# Patient Record
Sex: FEMALE | Race: WHITE | Employment: PART TIME | URBAN - METROPOLITAN AREA
[De-identification: names, ages, dates, MRNs, and addresses within clinical notes are randomized per-mention and may not be internally consistent; named-entity substitution may affect disease eponyms.]

---

## 2018-02-14 PROBLEM — I48.91 ATRIAL FIBRILLATION WITH RVR (HCC): Status: ACTIVE | Noted: 2018-02-14

## 2018-02-14 PROBLEM — I10 HTN (HYPERTENSION): Status: ACTIVE | Noted: 2018-02-14

## 2018-02-16 PROBLEM — I50.21 ACUTE SYSTOLIC CONGESTIVE HEART FAILURE (HCC): Status: ACTIVE | Noted: 2018-02-16

## 2018-02-19 ENCOUNTER — OFFICE VISIT (OUTPATIENT)
Dept: CARDIOLOGY CLINIC | Age: 68
End: 2018-02-19

## 2018-02-19 VITALS
WEIGHT: 192.4 LBS | DIASTOLIC BLOOD PRESSURE: 82 MMHG | BODY MASS INDEX: 32.06 KG/M2 | RESPIRATION RATE: 18 BRPM | HEART RATE: 70 BPM | HEIGHT: 65 IN | OXYGEN SATURATION: 97 % | SYSTOLIC BLOOD PRESSURE: 110 MMHG

## 2018-02-19 DIAGNOSIS — I48.19 PERSISTENT ATRIAL FIBRILLATION (HCC): Primary | ICD-10-CM

## 2018-02-19 DIAGNOSIS — I48.0 PAROXYSMAL ATRIAL FIBRILLATION (HCC): Primary | ICD-10-CM

## 2018-02-19 DIAGNOSIS — I10 ESSENTIAL HYPERTENSION: ICD-10-CM

## 2018-02-19 DIAGNOSIS — I42.9 CARDIOMYOPATHY, UNSPECIFIED TYPE (HCC): ICD-10-CM

## 2018-02-19 PROCEDURE — 99214 OFFICE O/P EST MOD 30 MIN: CPT | Performed by: NURSE PRACTITIONER

## 2018-02-19 RX ORDER — METOPROLOL SUCCINATE 100 MG/1
100 TABLET, EXTENDED RELEASE ORAL DAILY
Qty: 30 TABLET | Refills: 5 | Status: SHIPPED | OUTPATIENT
Start: 2018-02-19 | End: 2018-02-26 | Stop reason: SDUPTHER

## 2018-02-19 RX ORDER — LISINOPRIL 20 MG/1
20 TABLET ORAL DAILY
COMMUNITY
End: 2018-03-14 | Stop reason: SDUPTHER

## 2018-02-19 NOTE — PROGRESS NOTES
[facility]   Electrophysiology  Date: 2/19/2018    Chief Complaint   Patient presents with    Cardiomyopathy    Atrial Fibrillation       Cardiac HX: Linnea Rasmussen is a 79 y.o. woman with no cardiac history who presented with atrial fibrillation with RVR, s/s of an URI for about a week. She admitted to abd bloating off and on over the past several months.  Hypertensive, neg trops and EF 25% per echo. Was not interested in an ischemic eval.      Today: Patient d/c'd on Saturday and here for f/u. Feels weak and tired at home. Has been weighing self daily and is losing weight (trying). Is monitoring Na+ at home. Has not been checking B. Denies CP or SOB. Did have some swelling yesterday and then diuresed later in the day. Denies palpitations. Reviewed meds at length. Was not able to afford the [facility]. Home medications:   Current Outpatient Prescriptions on File Prior to Visit   Medication Sig Dispense Refill    aspirin 81 MG tablet Take 1 tablet by mouth daily 30 tablet 0    apixaban (ELIQUIS) 5 MG TABS tablet Take 1 tablet by mouth 2 times daily 60 tablet 0    hydrALAZINE (APRESOLINE) 50 MG tablet Take 1 tablet by mouth every 8 hours 90 tablet 0    furosemide (LASIX) 40 MG tablet Take 1 tablet by mouth daily 60 tablet 0    potassium chloride (KLOR-CON M) 20 MEQ extended release tablet Take 1 tablet by mouth 2 times daily (with meals) 60 tablet 0    Coenzyme Q10 (COQ10 PO) Take 1 capsule by mouth daily      CALCIUM-MAGNESIUM-VITAMIN D PO Take 1 capsule by mouth daily      PANTOTHENIC ACID PO Take 1 capsule by mouth daily      b complex vitamins capsule Take 1 capsule by mouth daily      vitamin B-12 (CYANOCOBALAMIN) 500 MCG tablet Take 500 mcg by mouth nightly       No current facility-administered medications on file prior to visit.         Past Medical History:   Diagnosis Date    Thyroid disease         Past Surgical History:   Procedure Laterality Date    HAND SURGERY Right · Abdominal: Soft. Bowel sounds present. No distension, No tenderness. · Musculoskeletal: No tenderness. No edema    · Lymphadenopathy: Has no cervical adenopathy. · Neurological: Alert and oriented. Cranial nerve appears intact, No Gross deficit   · Skin: Skin is warm and dry. No rash noted. · Psychiatric: Has a normal mood, affect and behavior     Labs:  Reviewed. Recent Labs      02/17/18   0522   NA  141   K  4.1   CL  106   CO2  24   BUN  15   CREATININE  0.9     No results for input(s): WBC, HGB, HCT, MCV, PLT in the last 72 hours. Lab Results   Component Value Date    TROPONINI <0.01 02/14/2018     No results found for: BNP  Lab Results   Component Value Date    PROTIME 12.7 02/14/2018    INR 1.12 02/14/2018     Lab Results   Component Value Date    CHOL 182 02/17/2018    HDL 68 02/17/2018    TRIG 60 02/17/2018       ECG: Personally reviewed: AF, HR 80, QRS 90, QTc 461    ECHO:  2/15/2018  Summary   Left ventricular size is normal. There is mild concentric left ventricular   hypertrophy. Left ventricular function appears moderately reduced with   ejection fraction estimated at 25%. Global hypokinesis of left ventricle.   The diastolic dysfunction is indeterminate due to heart rhythm. The mitral   valve leaflets are slightly thickened with normal leaflet mobility. Mitral   annular calcification is present. Moderate mitral regurgitation is present.  Hamida Abts is mild-moderate tricuspid regurgitation with RVSP estimated at 33   mmHg. Bi-atrial enlargement. There is a trivial circumferential pericardial   effusion noted. No prior echo for comparison.       Problem List:   Patient Active Problem List    Diagnosis Date Noted    Acute systolic congestive heart failure ([facility] [address] 02/16/2018     Priority: High    Persistent atrial fibrillation (HCC)      Priority: High    Cardiomyopathy [facility])      Priority: High    Atrial fibrillation with RVR ([facility] [address] 02/14/2018     Priority: Low    HTN (hypertension) 02/14/2018

## 2018-02-23 ENCOUNTER — TELEPHONE (OUTPATIENT)
Dept: CARDIOLOGY CLINIC | Age: 68
End: 2018-02-23

## 2018-02-26 RX ORDER — METOPROLOL SUCCINATE 50 MG/1
50 TABLET, EXTENDED RELEASE ORAL DAILY
Qty: 30 TABLET | Refills: 5 | Status: SHIPPED | OUTPATIENT
Start: 2018-02-26 | End: 2018-04-10 | Stop reason: SDUPTHER

## 2018-02-27 ENCOUNTER — TELEPHONE (OUTPATIENT)
Dept: CARDIOLOGY CLINIC | Age: 68
End: 2018-02-27

## 2018-02-27 DIAGNOSIS — R10.9 FLANK PAIN: Primary | ICD-10-CM

## 2018-02-28 LAB
BUN BLDV-MCNC: 25 MG/DL
CALCIUM SERPL-MCNC: 10.3 MG/DL
CHLORIDE BLD-SCNC: 101 MMOL/L
CO2: 31 MMOL/L
CREAT SERPL-MCNC: 1.15 MG/DL
GFR CALCULATED: NORMAL
GLUCOSE BLD-MCNC: 90 MG/DL
POTASSIUM SERPL-SCNC: 5.5 MMOL/L
SODIUM BLD-SCNC: 138 MMOL/L

## 2018-02-28 NOTE — TELEPHONE ENCOUNTER
Patient called per Anastasiya Nicolas RN. 1 single outlier BP with out s/s. Patient to monitor BP and come in on Thursday to check BP and BP device with RN.

## 2018-03-01 ENCOUNTER — NURSE ONLY (OUTPATIENT)
Dept: CARDIOLOGY CLINIC | Age: 68
End: 2018-03-01

## 2018-03-01 ENCOUNTER — TELEPHONE (OUTPATIENT)
Dept: CARDIOLOGY CLINIC | Age: 68
End: 2018-03-01

## 2018-03-01 VITALS — HEART RATE: 84 BPM | DIASTOLIC BLOOD PRESSURE: 80 MMHG | RESPIRATION RATE: 20 BRPM | SYSTOLIC BLOOD PRESSURE: 107 MMHG

## 2018-03-01 DIAGNOSIS — R10.9 LEFT FLANK PAIN: Primary | ICD-10-CM

## 2018-03-02 ENCOUNTER — HOSPITAL ENCOUNTER (OUTPATIENT)
Dept: OTHER | Age: 68
Discharge: HOME | End: 2018-03-02
Attending: NURSE PRACTITIONER | Admitting: NURSE PRACTITIONER

## 2018-03-02 DIAGNOSIS — R10.9 LEFT FLANK PAIN: ICD-10-CM

## 2018-03-02 LAB
AMORPHOUS: ABNORMAL /HPF
B-TYPE NATRIURETIC PEPTIDE: 366 PG/ML
BACTERIA: ABNORMAL /HPF
BILIRUBIN URINE: NEGATIVE
BLOOD, URINE: NEGATIVE
BUN / CREAT RATIO: 22 (CALC) (ref 6–22)
BUN BLDV-MCNC: 25 MG/DL (ref 7–25)
CALCIUM OXALATE CRYSTALS: ABNORMAL /HPF
CALCIUM SERPL-MCNC: 10.3 MG/DL (ref 8.6–10.4)
CASTS: ABNORMAL /LPF
CHLORIDE BLD-SCNC: 101 MMOL/L (ref 98–110)
CLARITY: CLEAR
CO2: 31 MMOL/L (ref 20–31)
COLOR: YELLOW
COMMENT: ABNORMAL
COMMENT: ABNORMAL
CREAT SERPL-MCNC: 1.15 MG/DL (ref 0.5–0.99)
CRYSTALS: ABNORMAL /HPF
CULTURE: NORMAL
EPITHELIAL CELLS, UA: ABNORMAL /HPF
GFR AFRICAN AMERICAN: 57 ML/MIN/1.73M2
GFR SERPL CREATININE-BSD FRML MDRD: 49 ML/MIN/1.73M2
GLUCOSE BLD-MCNC: 90 MG/DL (ref 65–99)
GLUCOSE URINE: NEGATIVE
GRANULAR CASTS: ABNORMAL /LPF
HYALINE CASTS: ABNORMAL /LPF
KETONES, URINE: NEGATIVE
LEUKOCYTE ESTERASE, URINE: ABNORMAL
LEUKOCYTES, UA: ABNORMAL /HPF
NITRITE, URINE: NEGATIVE
PH UA: 5.5 (ref 5–8)
POTASSIUM SERPL-SCNC: 5.5 MMOL/L (ref 3.5–5.3)
PROTEIN UA: NEGATIVE
RBC UA: ABNORMAL /HPF
REDUCING SUBSTANCES, URINE: ABNORMAL %
RENAL EPITHELIAL, POC: ABNORMAL /HPF
SODIUM BLD-SCNC: 138 MMOL/L (ref 135–146)
SPECIFIC GRAVITY UA: 1.01 (ref 1–1.03)
TRANSITIONAL EPITHELIAL: ABNORMAL /HPF
TRIPLE PHOSPHATE CRYSTALS: ABNORMAL /HPF
URATE CRYSTALS, URINE: ABNORMAL /HPF
YEAST: ABNORMAL /HPF

## 2018-03-05 ENCOUNTER — OFFICE VISIT (OUTPATIENT)
Dept: CARDIOLOGY CLINIC | Age: 68
End: 2018-03-05

## 2018-03-05 VITALS
HEART RATE: 75 BPM | BODY MASS INDEX: 31.18 KG/M2 | DIASTOLIC BLOOD PRESSURE: 64 MMHG | SYSTOLIC BLOOD PRESSURE: 100 MMHG | WEIGHT: 187.4 LBS

## 2018-03-05 DIAGNOSIS — I48.19 PERSISTENT ATRIAL FIBRILLATION (HCC): ICD-10-CM

## 2018-03-05 DIAGNOSIS — I42.9 CARDIOMYOPATHY, UNSPECIFIED TYPE (HCC): Primary | ICD-10-CM

## 2018-03-05 DIAGNOSIS — R10.9 LEFT FLANK PAIN: ICD-10-CM

## 2018-03-05 DIAGNOSIS — B02.9 HERPES ZOSTER WITHOUT COMPLICATION: ICD-10-CM

## 2018-03-05 DIAGNOSIS — I50.22 CHRONIC SYSTOLIC HEART FAILURE (HCC): ICD-10-CM

## 2018-03-05 PROCEDURE — 99214 OFFICE O/P EST MOD 30 MIN: CPT | Performed by: NURSE PRACTITIONER

## 2018-03-05 RX ORDER — FUROSEMIDE 40 MG/1
40 TABLET ORAL DAILY
Qty: 30 TABLET | Refills: 5 | Status: SHIPPED | OUTPATIENT
Start: 2018-03-05 | End: 2018-10-09 | Stop reason: SDUPTHER

## 2018-03-05 RX ORDER — HYDRALAZINE HYDROCHLORIDE 25 MG/1
25 TABLET, FILM COATED ORAL EVERY 8 HOURS SCHEDULED
Qty: 90 TABLET | Refills: 5 | Status: SHIPPED | OUTPATIENT
Start: 2018-03-05 | End: 2018-06-05 | Stop reason: ALTCHOICE

## 2018-03-05 NOTE — PATIENT INSTRUCTIONS
Metoprolol tartrate is the short acting metoprolol. This needs to be taken twice a day. Cut the 50 mg tablet in 1/2 and taken 1/2 in the morning and 1/2 at night. When these are gone then we will transition to the metoprolol succinate which is the long acting metoprolol. This is taken once a day. Patient Education        Shingles: Care Instructions  Your Care Instructions    Shingles (herpes zoster) causes pain and a blistered rash. The rash can appear anywhere on the body but will be on only one side of the body, the left or right. It will be in a band, a strip, or a small area. The pain can be very severe. Shingles can also cause tingling or itching in the area of the rash. The blisters scab over after a few days and heal in 2 to 4 weeks. Medicines can help you feel better and may help prevent more serious problems caused by shingles. Shingles is caused by the same virus that causes chickenpox. When you have chickenpox, the virus gets into your nerve roots and stays there (becomes dormant) long after you get over the chickenpox. If the virus becomes active again, it can cause shingles. Follow-up care is a key part of your treatment and safety. Be sure to make and go to all appointments, and call your doctor if you are having problems. It's also a good idea to know your test results and keep a list of the medicines you take. How can you care for yourself at home? · Be safe with medicines. Take your medicines exactly as prescribed. Call your doctor if you think you are having a problem with your medicine. Antiviral medicine helps you get better faster. · Try not to scratch or pick at the blisters. They will crust over and fall off on their own if you leave them alone. · Put cool, wet cloths on the area to relieve pain and itching. You can also use calamine lotion. Try not to use so much lotion that it cakes and is hard to get off.   · Put cornstarch or baking soda on the sores to help dry them out so they

## 2018-03-05 NOTE — PROGRESS NOTES
smoked. She has never used smokeless tobacco. She reports that she does not drink alcohol or use drugs. Family History:  Reviewed. family history includes Coronary Art Dis in her father; Other in her mother. Review of System:    · Constitutional: No fevers, chills. · Eyes: No visual changes or diplopia. No scleral icterus. · ENT: No Headaches. No mouth sores or sore throat. · Cardiovascular: No for chest pain, Yes for dyspnea on exertion, No for palpitations or No for loss of consciousness. No cough, hemoptysis, No for pleuritic pain, or phlebitis. · Respiratory: No for cough or wheezing. No hematemesis. · Gastrointestinal: No abdominal pain, blood in stools. · Genitourinary: No dysuria, or hematuria. · Musculoskeletal: No gait disturbance,    · Integumentary: No rash or pruritis. · Neurological: No headache, change in muscle strength, numbness or tingling. · Psychiatric: No anxiety, or depression. · Endocrine: No temperature intolerance. No excessive thirst, fluid intake, or urination. · Hem/Lymph: No abnormal bruising or bleeding, blood clots or swollen lymph nodes. · Allergic/Immunologic: No nasal congestion or hives. Physical Examination:  Vitals:    03/05/18 0951   BP: 100/64   Pulse: 75         Wt Readings from Last 3 Encounters:   03/05/18 187 lb 6.4 oz (85 kg)   02/19/18 192 lb 6.4 oz (87.3 kg)   02/17/18 196 lb 13.9 oz (89.3 kg)       · Constitutional: Oriented. No distress. · Head: Normocephalic and atraumatic. · Mouth/Throat: Oropharynx is clear and moist.   · Eyes: Conjunctivae clear without jaunduice. PERRL. · Neck: Neck supple. No rigidity. No JVD present. · Cardiovascular: Normal rate, regular rhythm, S1&S2. · Pulmonary/Chest: Bilateral respiratory sounds. No wheezes, No rhonchi. · Abdominal: Soft. Bowel sounds present. No distension, No tenderness. · Musculoskeletal: No tenderness. No edema    · Lymphadenopathy: Has no cervical adenopathy.

## 2018-03-08 DIAGNOSIS — I50.21 ACUTE SYSTOLIC CONGESTIVE HEART FAILURE (HCC): ICD-10-CM

## 2018-03-08 DIAGNOSIS — I48.19 PERSISTENT ATRIAL FIBRILLATION (HCC): Primary | ICD-10-CM

## 2018-03-08 DIAGNOSIS — I48.91 ATRIAL FIBRILLATION WITH RVR (HCC): ICD-10-CM

## 2018-03-08 DIAGNOSIS — I42.9 CARDIOMYOPATHY, UNSPECIFIED TYPE (HCC): ICD-10-CM

## 2018-03-08 DIAGNOSIS — I10 ESSENTIAL HYPERTENSION: ICD-10-CM

## 2018-03-14 DIAGNOSIS — I10 ESSENTIAL HYPERTENSION: Primary | ICD-10-CM

## 2018-03-14 RX ORDER — LISINOPRIL 20 MG/1
20 TABLET ORAL DAILY
Qty: 30 TABLET | Refills: 5 | Status: SHIPPED | OUTPATIENT
Start: 2018-03-14 | End: 2018-09-05

## 2018-03-14 NOTE — TELEPHONE ENCOUNTER
Spoke with pt. She has not been taking potassium. Advised to cut back on potassium rich foods and to get labs rechecked later next week. Pt verbalized understanding. Needs a refill on lisinopril.

## 2018-03-24 LAB
A/G RATIO: 1.7 (CALC) (ref 1–2.5)
ALBUMIN SERPL-MCNC: 3.8 G/DL (ref 3.6–5.1)
ALP BLD-CCNC: 60 U/L (ref 33–130)
ALT SERPL-CCNC: 15 U/L (ref 6–29)
AST SERPL-CCNC: 24 U/L (ref 10–35)
BILIRUB SERPL-MCNC: 0.5 MG/DL (ref 0.2–1.2)
BUN / CREAT RATIO: 18 (CALC) (ref 6–22)
BUN BLDV-MCNC: 21 MG/DL (ref 7–25)
CALCIUM SERPL-MCNC: 9.4 MG/DL (ref 8.6–10.4)
CHLORIDE BLD-SCNC: 106 MMOL/L (ref 98–110)
CO2: 30 MMOL/L (ref 20–31)
CREAT SERPL-MCNC: 1.14 MG/DL (ref 0.5–0.99)
GFR AFRICAN AMERICAN: 58 ML/MIN/1.73M2
GFR SERPL CREATININE-BSD FRML MDRD: 50 ML/MIN/1.73M2
GLOBULIN: 2.2 G/DL (CALC) (ref 1.9–3.7)
GLUCOSE BLD-MCNC: 72 MG/DL (ref 65–99)
POTASSIUM SERPL-SCNC: 4.4 MMOL/L (ref 3.5–5.3)
SODIUM BLD-SCNC: 143 MMOL/L (ref 135–146)
TOTAL PROTEIN: 6 G/DL (ref 6.1–8.1)

## 2018-03-27 LAB
ALBUMIN SERPL-MCNC: 3.8 G/DL
ALP BLD-CCNC: 60 U/L
ALT SERPL-CCNC: 15 U/L
ANION GAP SERPL CALCULATED.3IONS-SCNC: NORMAL MMOL/L
AST SERPL-CCNC: 24 U/L
BILIRUB SERPL-MCNC: 0.5 MG/DL (ref 0.1–1.4)
BUN BLDV-MCNC: 18 MG/DL
CALCIUM SERPL-MCNC: 9.4 MG/DL
CHLORIDE BLD-SCNC: 106 MMOL/L
CO2: 30 MMOL/L
CREAT SERPL-MCNC: 18 MG/DL
GFR CALCULATED: 50
GLUCOSE BLD-MCNC: 72 MG/DL
POTASSIUM SERPL-SCNC: 4.4 MMOL/L
SODIUM BLD-SCNC: 143 MMOL/L
TOTAL PROTEIN: 6

## 2018-04-10 RX ORDER — METOPROLOL SUCCINATE 50 MG/1
50 TABLET, EXTENDED RELEASE ORAL DAILY
Qty: 30 TABLET | Refills: 5 | Status: SHIPPED | OUTPATIENT
Start: 2018-04-10 | End: 2018-08-08 | Stop reason: DRUGHIGH

## 2018-04-18 ENCOUNTER — TELEPHONE (OUTPATIENT)
Dept: CARDIOLOGY CLINIC | Age: 68
End: 2018-04-18

## 2018-05-10 ENCOUNTER — OFFICE VISIT (OUTPATIENT)
Dept: PRIMARY CARE CLINIC | Age: 68
End: 2018-05-10

## 2018-05-10 VITALS
OXYGEN SATURATION: 98 % | RESPIRATION RATE: 16 BRPM | SYSTOLIC BLOOD PRESSURE: 120 MMHG | HEART RATE: 78 BPM | BODY MASS INDEX: 31.08 KG/M2 | HEIGHT: 63 IN | WEIGHT: 175.4 LBS | TEMPERATURE: 97.8 F | DIASTOLIC BLOOD PRESSURE: 78 MMHG

## 2018-05-10 DIAGNOSIS — Z28.21 IMMUNIZATION REFUSED: ICD-10-CM

## 2018-05-10 DIAGNOSIS — I50.21 ACUTE SYSTOLIC CONGESTIVE HEART FAILURE (HCC): ICD-10-CM

## 2018-05-10 DIAGNOSIS — I10 BENIGN ESSENTIAL HTN: ICD-10-CM

## 2018-05-10 DIAGNOSIS — I48.19 PERSISTENT ATRIAL FIBRILLATION (HCC): Primary | ICD-10-CM

## 2018-05-10 DIAGNOSIS — Z53.20 MAMMOGRAM DECLINED: ICD-10-CM

## 2018-05-10 DIAGNOSIS — Z53.20 COLONOSCOPY REFUSED: ICD-10-CM

## 2018-05-10 PROCEDURE — 99204 OFFICE O/P NEW MOD 45 MIN: CPT | Performed by: INTERNAL MEDICINE

## 2018-05-10 ASSESSMENT — PATIENT HEALTH QUESTIONNAIRE - PHQ9
2. FEELING DOWN, DEPRESSED OR HOPELESS: 0
1. LITTLE INTEREST OR PLEASURE IN DOING THINGS: 0
SUM OF ALL RESPONSES TO PHQ QUESTIONS 1-9: 0
SUM OF ALL RESPONSES TO PHQ9 QUESTIONS 1 & 2: 0

## 2018-05-31 ENCOUNTER — NURSE ONLY (OUTPATIENT)
Dept: CARDIOLOGY CLINIC | Age: 68
End: 2018-05-31

## 2018-05-31 ENCOUNTER — OFFICE VISIT (OUTPATIENT)
Dept: CARDIOLOGY CLINIC | Age: 68
End: 2018-05-31

## 2018-05-31 VITALS
SYSTOLIC BLOOD PRESSURE: 122 MMHG | OXYGEN SATURATION: 98 % | BODY MASS INDEX: 30.29 KG/M2 | DIASTOLIC BLOOD PRESSURE: 84 MMHG | WEIGHT: 171 LBS | HEART RATE: 94 BPM

## 2018-05-31 DIAGNOSIS — I50.22 CHRONIC SYSTOLIC HEART FAILURE (HCC): ICD-10-CM

## 2018-05-31 DIAGNOSIS — I10 ESSENTIAL HYPERTENSION: Primary | ICD-10-CM

## 2018-05-31 DIAGNOSIS — I48.19 PERSISTENT ATRIAL FIBRILLATION (HCC): ICD-10-CM

## 2018-05-31 DIAGNOSIS — I42.9 CARDIOMYOPATHY, UNSPECIFIED TYPE (HCC): ICD-10-CM

## 2018-05-31 PROCEDURE — 93000 ELECTROCARDIOGRAM COMPLETE: CPT | Performed by: INTERNAL MEDICINE

## 2018-05-31 PROCEDURE — 99214 OFFICE O/P EST MOD 30 MIN: CPT | Performed by: INTERNAL MEDICINE

## 2018-06-05 ENCOUNTER — HOSPITAL ENCOUNTER (OUTPATIENT)
Dept: CARDIAC CATH/INVASIVE PROCEDURES | Age: 68
Discharge: HOME | End: 2018-06-05
Attending: INTERNAL MEDICINE | Admitting: INTERNAL MEDICINE

## 2018-06-05 VITALS — BODY MASS INDEX: 27.99 KG/M2 | WEIGHT: 168 LBS | TEMPERATURE: 98.1 F | HEIGHT: 65 IN

## 2018-06-05 LAB
ANION GAP SERPL CALCULATED.3IONS-SCNC: 12 MMOL/L (ref 3–16)
BUN BLDV-MCNC: 24 MG/DL (ref 7–20)
CALCIUM SERPL-MCNC: 9.7 MG/DL (ref 8.3–10.6)
CHLORIDE BLD-SCNC: 103 MMOL/L (ref 99–110)
CO2: 28 MMOL/L (ref 21–32)
CREAT SERPL-MCNC: 1.2 MG/DL (ref 0.6–1.2)
EKG ATRIAL RATE: 43 BPM
EKG ATRIAL RATE: 66 BPM
EKG DIAGNOSIS: NORMAL
EKG DIAGNOSIS: NORMAL
EKG P AXIS: 11 DEGREES
EKG P-R INTERVAL: 236 MS
EKG Q-T INTERVAL: 402 MS
EKG Q-T INTERVAL: 490 MS
EKG QRS DURATION: 92 MS
EKG QRS DURATION: 98 MS
EKG QTC CALCULATION (BAZETT): 414 MS
EKG QTC CALCULATION (BAZETT): 421 MS
EKG R AXIS: -33 DEGREES
EKG R AXIS: -43 DEGREES
EKG T AXIS: -44 DEGREES
EKG T AXIS: -89 DEGREES
EKG VENTRICULAR RATE: 43 BPM
EKG VENTRICULAR RATE: 66 BPM
GFR AFRICAN AMERICAN: 54
GFR NON-AFRICAN AMERICAN: 45
GLUCOSE BLD-MCNC: 88 MG/DL (ref 70–99)
INR BLD: 2.2 (ref 0.8–1.4)
POTASSIUM REFLEX MAGNESIUM: 4.7 MMOL/L (ref 3.5–5.1)
SODIUM BLD-SCNC: 143 MMOL/L (ref 136–145)

## 2018-06-05 PROCEDURE — 93010 ELECTROCARDIOGRAM REPORT: CPT | Performed by: INTERNAL MEDICINE

## 2018-06-05 PROCEDURE — 92960 CARDIOVERSION ELECTRIC EXT: CPT | Performed by: INTERNAL MEDICINE

## 2018-06-05 RX ORDER — SODIUM CHLORIDE 0.9 % (FLUSH) 0.9 %
10 SYRINGE (ML) INJECTION PRN
Status: DISCONTINUED | OUTPATIENT
Start: 2018-06-05 | End: 2018-06-06 | Stop reason: HOSPADM

## 2018-06-05 RX ORDER — SODIUM CHLORIDE 0.9 % (FLUSH) 0.9 %
10 SYRINGE (ML) INJECTION EVERY 12 HOURS SCHEDULED
Status: DISCONTINUED | OUTPATIENT
Start: 2018-06-05 | End: 2018-06-06 | Stop reason: HOSPADM

## 2018-06-05 RX ORDER — MIDAZOLAM HYDROCHLORIDE 1 MG/ML
1 INJECTION INTRAMUSCULAR; INTRAVENOUS ONCE
Status: DISCONTINUED | OUTPATIENT
Start: 2018-06-05 | End: 2018-06-06 | Stop reason: HOSPADM

## 2018-06-05 RX ORDER — SODIUM CHLORIDE 9 MG/ML
INJECTION, SOLUTION INTRAVENOUS CONTINUOUS
Status: DISCONTINUED | OUTPATIENT
Start: 2018-06-05 | End: 2018-06-06 | Stop reason: HOSPADM

## 2018-06-22 ENCOUNTER — HOSPITAL ENCOUNTER (OUTPATIENT)
Dept: NON INVASIVE DIAGNOSTICS | Age: 68
Discharge: HOME | End: 2018-06-22
Attending: INTERNAL MEDICINE | Admitting: INTERNAL MEDICINE

## 2018-06-22 DIAGNOSIS — I48.19 PERSISTENT ATRIAL FIBRILLATION (HCC): ICD-10-CM

## 2018-06-22 LAB
LV EF: 53 %
LVEF MODALITY: NORMAL

## 2018-07-06 ENCOUNTER — OFFICE VISIT (OUTPATIENT)
Dept: CARDIOLOGY CLINIC | Age: 68
End: 2018-07-06

## 2018-07-06 VITALS
BODY MASS INDEX: 26.86 KG/M2 | HEART RATE: 103 BPM | WEIGHT: 161.4 LBS | DIASTOLIC BLOOD PRESSURE: 80 MMHG | SYSTOLIC BLOOD PRESSURE: 120 MMHG

## 2018-07-06 DIAGNOSIS — I48.19 PERSISTENT ATRIAL FIBRILLATION (HCC): Primary | ICD-10-CM

## 2018-07-06 PROCEDURE — 99214 OFFICE O/P EST MOD 30 MIN: CPT | Performed by: INTERNAL MEDICINE

## 2018-07-06 NOTE — PROGRESS NOTES
[address]   Cardiac Consultation    Referring Provider:  Michail Oppenheim, MD     CC:  Atrial Fibrillation, cardiomyopathy    HPI:  Rachel Johns is a 76 y.o. female with no prior cardiac history until hospitalization for atrial fibrillation with RVR on 2/14/18. She admitted to abd bloating off and on over the past several months. Echo (2/15/18) showed EF of 25%. Repeat echo (6/22/18) showed improved EF of 50-55%. She was not interested in an ischemic evaluation at that time. Underwent DCCV (6/5/18) for recurrent AF. She is currently supposed to be on Toprol XL 50 mg daily, lisinopril 20 mg daily, and Eliquis 5 mg BID for stroke prevention. However, she has stopped taking metoprolol and lisinopril (for about a month) on her own due to fatigue and \"foggy brain. \"  She is still taking Eliquis. ECG today shows AF, rate 103. She denies complaints of CP, palpitations, dizziness, orthopnea, presycope/syncope, and edema. She walks at least 30 min/day, including hills. She reports becoming PINTO with stairs, but improved from before. She confirms that she has lost about 30 lbs lately.     Past Medical History:   has a past medical history of Thyroid disease. Surgical History:   has a past surgical history that includes Hand surgery (Right) and Hysterectomy. Social History:   reports that she has never smoked. She has never used smokeless tobacco. She reports that she does not drink alcohol or use drugs. Family History:  family history includes Coronary Art Dis in her father; Other in her mother.      Home Medications:  Outpatient Encounter Prescriptions as of 7/6/2018   Medication Sig Dispense Refill    apixaban (ELIQUIS) 5 MG TABS tablet Take 1 tablet by mouth 2 times daily 60 tablet 5    aspirin 81 MG tablet Take 1 tablet by mouth daily 30 tablet 5    furosemide (LASIX) 40 MG tablet Take 1 tablet by mouth daily 30 tablet 5    Coenzyme Q10 (COQ10 PO) Take 1 capsule by Cardiovascular: Normal rate, irregular rhythm and normal heart sounds. Pulmonary/Chest: Effort normal and breath sounds normal.   Abdominal: Soft. No tenderness. Musculoskeletal: Normal range of motion. She exhibits no edema. Neurological: She is alert and oriented to person, place, and time. Skin: Skin is warm and dry. Psychiatric: She has a normal mood and affect. Assessment:  1. AFpersistent  2. Cardiomyopathy  3. Congestive heart failure      Plan:  1. Resume metoprolol and lisinopril for HR control and heart function. 2.  Continue Eliquis for stroke prevention. 3.  Follow up with EP NP in 1 month. Emili Vanessa RN, am scribing for and in the presence of Dr. Lisa Aranda. 07/06/18 10:58 AM  Suze Banegas RN    I, Dr. Lisa Aranda, personally performed the services described in this documentation as scribed by Suze Banegas RN in my presence, and it is both accurate and complete.     Lisa Aranda M.D.

## 2018-08-08 ENCOUNTER — OFFICE VISIT (OUTPATIENT)
Dept: CARDIOLOGY CLINIC | Age: 68
End: 2018-08-08

## 2018-08-08 VITALS
BODY MASS INDEX: 25.96 KG/M2 | DIASTOLIC BLOOD PRESSURE: 80 MMHG | SYSTOLIC BLOOD PRESSURE: 130 MMHG | WEIGHT: 156 LBS | HEART RATE: 108 BPM

## 2018-08-08 DIAGNOSIS — I10 BENIGN ESSENTIAL HTN: ICD-10-CM

## 2018-08-08 DIAGNOSIS — I48.19 PERSISTENT ATRIAL FIBRILLATION (HCC): Primary | ICD-10-CM

## 2018-08-08 DIAGNOSIS — I42.8 CARDIOMYOPATHY, NONISCHEMIC (HCC): ICD-10-CM

## 2018-08-08 PROCEDURE — 93000 ELECTROCARDIOGRAM COMPLETE: CPT | Performed by: NURSE PRACTITIONER

## 2018-08-08 PROCEDURE — 99214 OFFICE O/P EST MOD 30 MIN: CPT | Performed by: NURSE PRACTITIONER

## 2018-08-08 RX ORDER — METOPROLOL SUCCINATE 50 MG/1
25 TABLET, EXTENDED RELEASE ORAL DAILY
Qty: 30 TABLET | Refills: 5 | Status: SHIPPED
Start: 2018-08-08 | End: 2018-09-05

## 2018-08-08 NOTE — PROGRESS NOTES
[address]   Electrophysiology  Date: 8/8/2018    Chief Complaint   Patient presents with    Atrial Fibrillation    Cardiomyopathy       Cardiac HX: Everett Senior is a 76 y.o. woman with no cardiac history and  hospitalization 2/14/18 with atrial fibrillation with RVR, after s/s of an URI for about a week and abd bloating off and on over the previous several months.  Hypertensive, neg trops and EF 25% per echo. Was not interested in an ischemic eval. Repeat echo showed an EF of 50-55% (6/2018). Did not tolerate hydralazine. Had stopped Toprol and lisinopril due to \"fogginess\". Meds restarted and patient here for a f/u. Today: Patient states she did not take the Toprol and lisinopril as it made her feel so bad. She is exercising and has lost weight. She is eating better. The combination of the BB and ACE made her fingers turn blue, caused aches and pains and made her feel bad. She is taking the Eliquis and furosemide. She is down more than 40# since February due to diet and exercise.        Home medications:   Current Outpatient Prescriptions on File Prior to Visit   Medication Sig Dispense Refill    apixaban (ELIQUIS) 5 MG TABS tablet Take 1 tablet by mouth 2 times daily 60 tablet 5    aspirin 81 MG tablet Take 1 tablet by mouth daily 30 tablet 5    furosemide (LASIX) 40 MG tablet Take 1 tablet by mouth daily 30 tablet 5    Coenzyme Q10 (COQ10 PO) Take 1 capsule by mouth daily      CALCIUM-MAGNESIUM-VITAMIN D PO Take 1 capsule by mouth daily      PANTOTHENIC ACID PO Take 1 capsule by mouth daily      b complex vitamins capsule Take 1 capsule by mouth daily      vitamin B-12 (CYANOCOBALAMIN) 500 MCG tablet Take 500 mcg by mouth nightly      metoprolol succinate (TOPROL XL) 50 MG extended release tablet Take 1 tablet by mouth daily 30 tablet 5    lisinopril (PRINIVIL;ZESTRIL) 20 MG tablet Take 1 tablet by mouth daily 30 tablet 5     No current facility-administered medications on file prior to visit. Past Medical History:   Diagnosis Date    Thyroid disease         Past Surgical History:   Procedure Laterality Date    HAND SURGERY Right     childhood and correction as teenager    HYSTERECTOMY         Allergies   Allergen Reactions    Pcn [Penicillins] Anaphylaxis       Social History:  Reviewed. reports that she has never smoked. She has never used smokeless tobacco. She reports that she does not drink alcohol or use drugs. Family History:  Reviewed. family history includes Coronary Art Dis in her father; Other in her mother. Review of System:    · Constitutional: No fevers, chills. · Eyes: No visual changes or diplopia. No scleral icterus. · ENT: No Headaches. No mouth sores or sore throat. · Cardiovascular: No for chest pain, Yes for dyspnea on exertion, No for palpitations or No for loss of consciousness. No cough, hemoptysis, No for pleuritic pain, or phlebitis. · Respiratory: No for cough or wheezing. No hematemesis. · Gastrointestinal: No abdominal pain, blood in stools. · Genitourinary: No dysuria, or hematuria. · Musculoskeletal: No gait disturbance,    · Integumentary: No rash or pruritis. · Neurological: No headache, change in muscle strength, numbness or tingling. · Psychiatric: No anxiety, or depression. · Endocrine: No temperature intolerance. No excessive thirst, fluid intake, or urination. · Hem/Lymph: No abnormal bruising or bleeding, blood clots or swollen lymph nodes. · Allergic/Immunologic: No nasal congestion or hives. Physical Examination:  Vitals:    08/08/18 1009   BP: 130/80   Pulse: 108         Wt Readings from Last 3 Encounters:   08/08/18 156 lb (70.8 kg)   07/06/18 161 lb 6.4 oz (73.2 kg)   06/05/18 168 lb (76.2 kg)       · Constitutional: Oriented. No distress. · Head: Normocephalic and atraumatic. · Mouth/Throat: Oropharynx is clear and moist.   · Eyes: Conjunctivae clear without jaunduice. PERRL. · Neck: Neck supple.  No up at this time     + systolic HF, EF 72%, now 07%  No known CAD  Anticoagulation for AF  No tobacco use.      All questions and concerns were addressed to the patient/family. Alternatives to my treatment were discussed. The note was completed using EMR. Every effort was made to ensure accuracy; however, inadvertent computerized transcription errors may be present. Patient received education regarding their diagnosis, treatment and medications while in the office today.       Kathy Melara [address] [facility]

## 2018-09-05 ENCOUNTER — OFFICE VISIT (OUTPATIENT)
Dept: CARDIOLOGY CLINIC | Age: 68
End: 2018-09-05

## 2018-09-05 VITALS
DIASTOLIC BLOOD PRESSURE: 80 MMHG | BODY MASS INDEX: 25.13 KG/M2 | SYSTOLIC BLOOD PRESSURE: 100 MMHG | HEART RATE: 117 BPM | WEIGHT: 151 LBS

## 2018-09-05 DIAGNOSIS — I48.19 PERSISTENT ATRIAL FIBRILLATION (HCC): Primary | ICD-10-CM

## 2018-09-05 DIAGNOSIS — I10 ESSENTIAL HYPERTENSION: ICD-10-CM

## 2018-09-05 DIAGNOSIS — I42.0 DILATED CARDIOMYOPATHY (HCC): ICD-10-CM

## 2018-09-05 DIAGNOSIS — I50.22 CHRONIC SYSTOLIC HEART FAILURE (HCC): ICD-10-CM

## 2018-09-05 PROCEDURE — 99214 OFFICE O/P EST MOD 30 MIN: CPT | Performed by: NURSE PRACTITIONER

## 2018-09-05 NOTE — PROGRESS NOTES
[facility]   Electrophysiology  Date: 9/5/2018    Chief Complaint   Patient presents with    Atrial Fibrillation    Cardiomyopathy       Cardiac HX: Tanya Hinkle is a 76 y.o. woman with no cardiac history, hospitalized 2/14/18 with AF/RVR, after an URI and abd bloating, found to be hypertensive, with an EF 25% per echo. Was not interested in an ischemic eval. Repeat echo showed an EF of 50-55% (6/2018). Did not tolerate hydralazine. Had stopped Toprol and lisinopril due to \"fogginess\". Meds restarted and patient here for a f/u. Today: Patient had not tolerated the hydralazine and had stopped the Toprol and lisinopril due to fogginess. Patient was retried on the Toprol with c/o burning sensation in her bones, cold and blue hands and brain fog. HR is 117 in AF today. She continues to watch her diet, remain active. Home medications:   Current Outpatient Prescriptions on File Prior to Visit   Medication Sig Dispense Refill    apixaban (ELIQUIS) 5 MG TABS tablet Take 1 tablet by mouth 2 times daily 60 tablet 5    aspirin 81 MG tablet Take 1 tablet by mouth daily 30 tablet 5    furosemide (LASIX) 40 MG tablet Take 1 tablet by mouth daily 30 tablet 5    Coenzyme Q10 (COQ10 PO) Take 1 capsule by mouth daily      CALCIUM-MAGNESIUM-VITAMIN D PO Take 1 capsule by mouth daily      PANTOTHENIC ACID PO Take 1 capsule by mouth daily      b complex vitamins capsule Take 1 capsule by mouth daily      vitamin B-12 (CYANOCOBALAMIN) 500 MCG tablet Take 500 mcg by mouth nightly       No current facility-administered medications on file prior to visit. Past Medical History:   Diagnosis Date    Thyroid disease         Past Surgical History:   Procedure Laterality Date    HAND SURGERY Right     childhood and correction as teenager    HYSTERECTOMY         Allergies   Allergen Reactions    Pcn [Penicillins] Anaphylaxis       Social History:  Reviewed. reports that she has never smoked.  She has never used smokeless tobacco. She reports that she does not drink alcohol or use drugs. Family History:  Reviewed. family history includes Coronary Art Dis in her father; Other in her mother. Review of System:    · Constitutional: No fevers, chills. · Eyes: No visual changes or diplopia. No scleral icterus. · ENT: No Headaches. No mouth sores or sore throat. · Cardiovascular: No for chest pain, Yes for dyspnea on exertion, No for palpitations or No for loss of consciousness. No cough, hemoptysis, No for pleuritic pain, or phlebitis. · Respiratory: No for cough or wheezing. No hematemesis. · Gastrointestinal: No abdominal pain, blood in stools. · Genitourinary: No dysuria, or hematuria. · Musculoskeletal: No gait disturbance,    · Integumentary: No rash or pruritis. · Neurological: No headache, change in muscle strength, numbness or tingling. · Psychiatric: No anxiety, or depression. · Endocrine: No temperature intolerance. No excessive thirst, fluid intake, or urination. · Hem/Lymph: No abnormal bruising or bleeding, blood clots or swollen lymph nodes. · Allergic/Immunologic: No nasal congestion or hives. Physical Examination:  Vitals:    09/05/18 1033   BP: 100/80   Pulse: 117         Wt Readings from Last 3 Encounters:   09/05/18 151 lb (68.5 kg)   08/08/18 156 lb (70.8 kg)   07/06/18 161 lb 6.4 oz (73.2 kg)       · Constitutional: Oriented. No distress. · Head: Normocephalic and atraumatic. · Mouth/Throat: Oropharynx is clear and moist.   · Eyes: Conjunctivae clear without jaunduice. PERRL. · Neck: Neck supple. No rigidity. No JVD present. · Cardiovascular: tachy rate, irregular rhythm, S1&S2. · Pulmonary/Chest: Bilateral respiratory sounds. No wheezes, No rhonchi. · Abdominal: Soft. Bowel sounds present. No distension, No tenderness. · Musculoskeletal: No tenderness. No edema    · Lymphadenopathy: Has no cervical adenopathy. · Neurological: Alert and oriented.

## 2018-09-07 ENCOUNTER — NURSE ONLY (OUTPATIENT)
Dept: CARDIOLOGY CLINIC | Age: 68
End: 2018-09-07

## 2018-09-13 PROCEDURE — 93224 XTRNL ECG REC UP TO 48 HRS: CPT | Performed by: INTERNAL MEDICINE

## 2018-09-19 DIAGNOSIS — I48.19 PERSISTENT ATRIAL FIBRILLATION (HCC): ICD-10-CM

## 2018-09-19 DIAGNOSIS — I48.91 ATRIAL FIBRILLATION WITH RVR (HCC): ICD-10-CM

## 2018-10-05 ENCOUNTER — TELEPHONE (OUTPATIENT)
Dept: CARDIOLOGY CLINIC | Age: 68
End: 2018-10-05

## 2018-10-09 RX ORDER — FUROSEMIDE 40 MG/1
40 TABLET ORAL DAILY
Qty: 90 TABLET | Refills: 3 | Status: SHIPPED | OUTPATIENT
Start: 2018-10-09 | End: 2019-04-08 | Stop reason: SDUPTHER

## 2018-10-10 NOTE — PROGRESS NOTES
[address]   Cardiac Consultation    Referring Provider:  Leann Allen MD     CC:  Atrial Fibrillation, cardiomyopathy    HPI:  Lucien Louis is a 76 y.o. female with no prior cardiac history until hospitalization for atrial fibrillation with RVR on 2/14/18. She admitted to abd bloating off and on over the past several months. Echo (2/15/18) showed EF of 25%. Repeat echo (6/22/18) showed improved EF of 50-55%. She was not interested in an ischemic evaluation at that time. Underwent DCCV (6/5/18) for recurrent AF. She is currently supposed to be on Toprol XL 50 mg daily, lisinopril 20 mg daily, and Eliquis 5 mg BID for stroke prevention. However, she has stopped taking metoprolol and lisinopril (for about a month) on her own due to fatigue and \"foggy brain. \"  She is still taking Eliquis for stroke prevention. Pt is here for a follow up. She denies complaints of CP, palpitations, dizziness, orthopnea, presycope/syncope, and edema. Pt states that she feels better and has good activity tolerance. Pt has been walking and riding her bike. Pt still reluctant to take her Metoprolol and lisinopril. I strongly recommended starting Coreg to treat her but pt is refusing to take any additional medications at this time. Pt is aware that my official recommendation is to start Coreg and vasodilator for her cardiomyopathy.     Past Medical History:   has a past medical history of Thyroid disease. Surgical History:   has a past surgical history that includes Hand surgery (Right) and Hysterectomy. Social History:   reports that she has never smoked. She has never used smokeless tobacco. She reports that she does not drink alcohol or use drugs. Family History:  family history includes Coronary Art Dis in her father; Other in her mother.      Home Medications:  Outpatient Encounter Prescriptions as of 10/12/2018   Medication Sig Dispense Refill    furosemide (LASIX) 40 MG tablet

## 2018-10-12 ENCOUNTER — OFFICE VISIT (OUTPATIENT)
Dept: CARDIOLOGY CLINIC | Age: 68
End: 2018-10-12
Payer: MEDICARE

## 2018-10-12 VITALS
SYSTOLIC BLOOD PRESSURE: 122 MMHG | WEIGHT: 145.4 LBS | HEIGHT: 65 IN | DIASTOLIC BLOOD PRESSURE: 80 MMHG | HEART RATE: 61 BPM | BODY MASS INDEX: 24.22 KG/M2 | RESPIRATION RATE: 99 BRPM

## 2018-10-12 DIAGNOSIS — I48.19 PERSISTENT ATRIAL FIBRILLATION (HCC): Primary | ICD-10-CM

## 2018-10-12 PROCEDURE — 93000 ELECTROCARDIOGRAM COMPLETE: CPT | Performed by: INTERNAL MEDICINE

## 2018-10-12 PROCEDURE — 99214 OFFICE O/P EST MOD 30 MIN: CPT | Performed by: INTERNAL MEDICINE

## 2018-11-05 ENCOUNTER — TELEPHONE (OUTPATIENT)
Dept: CARDIOLOGY CLINIC | Age: 68
End: 2018-11-05

## 2018-12-03 ENCOUNTER — TELEPHONE (OUTPATIENT)
Dept: CARDIOLOGY CLINIC | Age: 68
End: 2018-12-03

## 2018-12-05 ENCOUNTER — OFFICE VISIT (OUTPATIENT)
Dept: CARDIOLOGY CLINIC | Age: 68
End: 2018-12-05
Payer: MEDICARE

## 2018-12-05 VITALS
SYSTOLIC BLOOD PRESSURE: 110 MMHG | HEIGHT: 65 IN | BODY MASS INDEX: 22.66 KG/M2 | WEIGHT: 136 LBS | DIASTOLIC BLOOD PRESSURE: 70 MMHG | HEART RATE: 95 BPM

## 2018-12-05 DIAGNOSIS — I42.0 DILATED CARDIOMYOPATHY (HCC): Primary | ICD-10-CM

## 2018-12-05 DIAGNOSIS — I48.91 ATRIAL FIBRILLATION WITH RVR (HCC): ICD-10-CM

## 2018-12-05 DIAGNOSIS — I10 ESSENTIAL HYPERTENSION: ICD-10-CM

## 2018-12-05 DIAGNOSIS — I50.22 CHRONIC SYSTOLIC HEART FAILURE (HCC): ICD-10-CM

## 2018-12-05 PROCEDURE — 99214 OFFICE O/P EST MOD 30 MIN: CPT | Performed by: NURSE PRACTITIONER

## 2018-12-05 PROCEDURE — 93000 ELECTROCARDIOGRAM COMPLETE: CPT | Performed by: NURSE PRACTITIONER

## 2018-12-05 NOTE — PROGRESS NOTES
intact, No Gross deficit   · Skin: Skin is warm and dry. · Psychiatric: Has a normal mood, affect and behavior     Labs:  Reviewed. No results for input(s): NA, K, CL, CO2, PHOS, BUN, CREATININE in the last 72 hours. Invalid input(s): CA,  TSH  No results for input(s): WBC, HGB, HCT, MCV, PLT in the last 72 hours. Lab Results   Component Value Date    TROPONINI <0.01 02/14/2018     Lab Results   Component Value Date     02/28/2018     Lab Results   Component Value Date    PROTIME 12.7 02/14/2018    INR 2.2 06/05/2018    INR 1.12 02/14/2018     Lab Results   Component Value Date    CHOL 182 02/17/2018    HDL 68 02/17/2018    TRIG 60 02/17/2018       ECG: Personally reviewed: AF, HR 95, QRS 94, QTc 409    ECHO: 6/22/2018  Summary   Normal left ventricular size and wall thickness.   Normal left ventricular systolic function with an estimate ejection fraction   of 50-55%.   There are no regional wall motion abnormalities.   Diastolic function is indeterminate.   The patient's underlying rhythm appears to be atrial fibrillation.   Difficult to assess left ventricular function d/t atrial fibrillation.   Bi-atrial enlargement.   Trace pulmonic regurgitation.   Mild mitral, aortic, and tricuspid regurgitation.   Estimated pulmonary artery systolic pressure is normal at 20 mmHg assuming a   right atrial pressure of 3 mmHg. 2/15/2018  Summary   Left ventricular size is normal. There is mild concentric left ventricular   hypertrophy. Left ventricular function appears moderately reduced with   ejection fraction estimated at 25%. Global hypokinesis of left ventricle.   The diastolic dysfunction is indeterminate due to heart rhythm. The mitral   valve leaflets are slightly thickened with normal leaflet mobility. Mitral   annular calcification is present. Moderate mitral regurgitation is present.  Garnetta Rank is mild-moderate tricuspid regurgitation with RVSP estimated at 33   mmHg. Bi-atrial enlargement.  There is a

## 2019-01-02 ENCOUNTER — TELEPHONE (OUTPATIENT)
Dept: CARDIOLOGY CLINIC | Age: 69
End: 2019-01-02

## 2019-02-08 ENCOUNTER — OFFICE VISIT (OUTPATIENT)
Dept: CARDIOLOGY CLINIC | Age: 69
End: 2019-02-08
Payer: MEDICARE

## 2019-02-08 VITALS
SYSTOLIC BLOOD PRESSURE: 122 MMHG | HEIGHT: 65 IN | DIASTOLIC BLOOD PRESSURE: 82 MMHG | WEIGHT: 131.4 LBS | BODY MASS INDEX: 21.89 KG/M2 | HEART RATE: 96 BPM

## 2019-02-08 DIAGNOSIS — I42.0 DILATED CARDIOMYOPATHY (HCC): Primary | ICD-10-CM

## 2019-02-08 DIAGNOSIS — I50.22 CHRONIC SYSTOLIC HEART FAILURE (HCC): ICD-10-CM

## 2019-02-08 DIAGNOSIS — I42.9 CARDIOMYOPATHY, UNSPECIFIED TYPE (HCC): ICD-10-CM

## 2019-02-08 DIAGNOSIS — I10 BENIGN ESSENTIAL HTN: ICD-10-CM

## 2019-02-08 DIAGNOSIS — I10 ESSENTIAL HYPERTENSION: ICD-10-CM

## 2019-02-08 DIAGNOSIS — I48.19 PERSISTENT ATRIAL FIBRILLATION (HCC): ICD-10-CM

## 2019-02-08 PROCEDURE — 93000 ELECTROCARDIOGRAM COMPLETE: CPT | Performed by: INTERNAL MEDICINE

## 2019-02-08 PROCEDURE — 99214 OFFICE O/P EST MOD 30 MIN: CPT | Performed by: INTERNAL MEDICINE

## 2019-03-06 ENCOUNTER — TELEPHONE (OUTPATIENT)
Dept: CARDIOLOGY CLINIC | Age: 69
End: 2019-03-06

## 2019-04-08 ENCOUNTER — TELEPHONE (OUTPATIENT)
Dept: CARDIOLOGY CLINIC | Age: 69
End: 2019-04-08

## 2019-04-08 DIAGNOSIS — E78.5 HYPERLIPIDEMIA, UNSPECIFIED HYPERLIPIDEMIA TYPE: Primary | ICD-10-CM

## 2019-04-08 DIAGNOSIS — I51.89 DIASTOLIC DYSFUNCTION: ICD-10-CM

## 2019-04-08 RX ORDER — FUROSEMIDE 40 MG/1
40 TABLET ORAL DAILY
Qty: 120 TABLET | Refills: 3 | Status: SHIPPED | OUTPATIENT
Start: 2019-04-08 | End: 2020-01-21 | Stop reason: SDUPTHER

## 2019-04-08 NOTE — TELEPHONE ENCOUNTER
Called and spoke with patient. She occasionally has to take an extra lasix. Will call in a few extra. To get a BMP and a lipid. Orders placed.

## 2019-04-08 NOTE — TELEPHONE ENCOUNTER
Patient called to request samples of her Eliquis 5mg. In the course of the conversation she also asked if it would be appropriate to take extra lasix when needed and asked if we could change her dosage to provide her extra. She states that she is currently taking 1/2 tab twice a day, and feels that her ankles/feet are more swollen than usual.  No other symptoms-SOB, wt gain. Please see if this would be appropriate for her to do. Thanks. She will be leaving for work however okay to leave message at home.   [phone]

## 2019-04-11 ENCOUNTER — TELEPHONE (OUTPATIENT)
Dept: CARDIOLOGY CLINIC | Age: 69
End: 2019-04-11

## 2019-04-11 LAB
BUN / CREAT RATIO: 23 (CALC) (ref 6–22)
BUN BLDV-MCNC: 26 MG/DL (ref 7–25)
CALCIUM SERPL-MCNC: 9.9 MG/DL (ref 8.6–10.4)
CHLORIDE BLD-SCNC: 104 MMOL/L (ref 98–110)
CHOLESTEROL, TOTAL: 225 MG/DL
CHOLESTEROL/HDL RATIO: 2.6 (CALC)
CHOLESTEROL: 138 MG/DL (CALC)
CO2: 31 MMOL/L (ref 20–32)
COPY TO: NORMAL
CREAT SERPL-MCNC: 1.12 MG/DL (ref 0.5–0.99)
GFR AFRICAN AMERICAN: 58 ML/MIN/1.73M2
GFR, ESTIMATED: 50 ML/MIN/1.73M2
GLUCOSE BLD-MCNC: 79 MG/DL (ref 65–99)
HDLC SERPL-MCNC: 87 MG/DL
LDL CHOLESTEROL CALCULATED: 123 MG/DL (CALC)
POTASSIUM SERPL-SCNC: 4.4 MMOL/L (ref 3.5–5.3)
SODIUM BLD-SCNC: 141 MMOL/L (ref 135–146)
TRIGL SERPL-MCNC: 63 MG/DL

## 2019-04-15 ENCOUNTER — TELEPHONE (OUTPATIENT)
Dept: CARDIOLOGY CLINIC | Age: 69
End: 2019-04-15

## 2019-04-15 DIAGNOSIS — E78.2 MIXED HYPERLIPIDEMIA: Primary | ICD-10-CM

## 2019-04-15 NOTE — TELEPHONE ENCOUNTER
Called and M for Charlotte to call the office back. Giovannaakibubba Strong is out of the office. Her Electrolytes look stable and her Cholesterol looks a littler higher than last labs. Will have Kleber review when she gets back tomorrow and call her if Mary Carmen Strong wants to make any changes.   Thanks

## 2019-04-15 NOTE — TELEPHONE ENCOUNTER
Patient called to discuss her labs with Nickolas Muro. Please call her after 2:00 at [phone]. Thanks.

## 2019-04-17 NOTE — TELEPHONE ENCOUNTER
Dawn Buchanan called again this morning. Gave her her labs results. She stated she is not interested in taking any cholesterol medications at this time but has been eating a lot of healthy fats and will cut back. She would like to recheck in about 3 months.

## 2019-05-07 ENCOUNTER — TELEPHONE (OUTPATIENT)
Dept: CARDIOLOGY CLINIC | Age: 69
End: 2019-05-07

## 2019-06-07 ENCOUNTER — TELEPHONE (OUTPATIENT)
Dept: CARDIOLOGY CLINIC | Age: 69
End: 2019-06-07

## 2019-08-09 ENCOUNTER — TELEPHONE (OUTPATIENT)
Dept: CARDIOLOGY CLINIC | Age: 69
End: 2019-08-09

## 2019-08-12 ENCOUNTER — OFFICE VISIT (OUTPATIENT)
Dept: CARDIOLOGY CLINIC | Age: 69
End: 2019-08-12
Payer: MEDICARE

## 2019-08-12 VITALS
DIASTOLIC BLOOD PRESSURE: 76 MMHG | SYSTOLIC BLOOD PRESSURE: 126 MMHG | HEIGHT: 65 IN | HEART RATE: 90 BPM | WEIGHT: 127.2 LBS | BODY MASS INDEX: 21.19 KG/M2

## 2019-08-12 DIAGNOSIS — I48.19 PERSISTENT ATRIAL FIBRILLATION (HCC): Primary | ICD-10-CM

## 2019-08-12 DIAGNOSIS — I10 ESSENTIAL HYPERTENSION: ICD-10-CM

## 2019-08-12 DIAGNOSIS — I50.22 CHRONIC SYSTOLIC HEART FAILURE (HCC): ICD-10-CM

## 2019-08-12 PROCEDURE — 93000 ELECTROCARDIOGRAM COMPLETE: CPT | Performed by: INTERNAL MEDICINE

## 2019-08-12 PROCEDURE — 99214 OFFICE O/P EST MOD 30 MIN: CPT | Performed by: INTERNAL MEDICINE

## 2020-01-21 RX ORDER — FUROSEMIDE 40 MG/1
40 TABLET ORAL DAILY
Qty: 120 TABLET | Refills: 3 | Status: SHIPPED | OUTPATIENT
Start: 2020-01-21 | End: 2021-03-04 | Stop reason: SDUPTHER

## 2020-01-21 NOTE — TELEPHONE ENCOUNTER
[facility] Medication Refills:    Requested Prescriptions     Pending Prescriptions Disp Refills    furosemide (LASIX) 40 MG tablet 120 tablet 3     Sig: Take 1 tablet by mouth daily And 1/2 tab prn weight gain > 3 #                     Last Office Visit: 8/12/19    Next Office Visit: 2/24/20  Last Refill:4/8/19  Last Labs:4/10/19

## 2020-03-11 NOTE — PROGRESS NOTES
Other in her mother. Home Medications:  Outpatient Encounter Medications as of 3/16/2020   Medication Sig Dispense Refill    furosemide (LASIX) 40 MG tablet Take 1 tablet by mouth daily And 1/2 tab prn weight gain > 3 # 120 tablet 3    apixaban (ELIQUIS) 5 MG TABS tablet Take 1 tablet by mouth 2 times daily 60 tablet 5    Coenzyme Q10 (COQ10 PO) Take 1 capsule by mouth daily      CALCIUM-MAGNESIUM-VITAMIN D PO Take 1 capsule by mouth daily      PANTOTHENIC ACID PO Take 1 capsule by mouth daily      vitamin B-12 (CYANOCOBALAMIN) 500 MCG tablet Take 500 mcg by mouth nightly       No facility-administered encounter medications on file as of 3/16/2020. Allergies:  Pcn [penicillins]     Review of Systems   Constitutional: Negative. HENT: Negative. Eyes: Negative. Respiratory: Negative. Cardiovascular: Negative. Gastrointestinal: Negative. Genitourinary: Negative. Musculoskeletal: Negative. Skin: Negative. Neurological: Negative. Hematological: Negative. Psychiatric/Behavioral: Negative. /82   Pulse 91   Wt 128 lb 6.4 oz (58.2 kg)   BMI 21.37 kg/m²     ECG 3/16/20, personally reviewed, AF V rate 91 bpm    Echo 6/22/18  Normal left ventricular size and wall thickness.   Normal left ventricular systolic function with an estimate ejection fraction   of 50-55%.   There are no regional wall motion abnormalities.   Diastolic function is indeterminate.   The patient's underlying rhythm appears to be atrial fibrillation.   Difficult to assess left ventricular function d/t atrial fibrillation.   Bi-atrial enlargement.   Trace pulmonic regurgitation.   Mild mitral, aortic, and tricuspid regurgitation.   Estimated pulmonary artery systolic pressure is normal at 20 mmHg assuming a   right atrial pressure of 3 mmHg.     Objective:  Physical Exam   Constitutional: She is oriented to person, place, and time. She appears well-developed and well-nourished.    HENT:   Head: Normocephalic and atraumatic. Eyes: Pupils are equal, round, and reactive to light. Neck: Normal range of motion. Cardiovascular: Normal rate, irregular rhythm and normal heart sounds. Pulmonary/Chest: Effort normal and breath sounds normal.   Abdominal: Soft. No tenderness. Musculoskeletal: Normal range of motion. She exhibits no edema. Neurological: She is alert and oriented to person, place, and time. Skin: Skin is warm and dry. Psychiatric: She has a normal mood and affect. Assessment:  1. AF-persistent with slightly elevated HR during the day. Complains of occasional heart racing associated with anxiety. Would potentially benefit from small dose of BB, but she continues to refuse. 2. Cardiomyopathy-- Pt still reluctant to take her Metoprolol and lisinopril. I strongly recommended starting Coreg or Toprol to treat her but pt is refusing to take any additional medications at this time. Pt is aware that my official recommendation is to start BB and vasodilator for her cardiomyopathy. She has done well clinically in the absence of these medications. 3. Congestive heart failure      Plan:  1. Continue Eliquis for stroke prevention. 2.  Advised to start BB and ACEi. She continues to refuse these medications. 3.  Continue to monitor HR's at home. 4.  Follow up in 1 year. Melba Sotomayor RN, am scribing for and in the presence of Dr. Flavia Orourke. 03/16/20 8:38 AM  Analisa Balderas RN    I, Dr. Flavia Orourke, personally performed the services described in this documentation as scribed by Analisa Balderas RN in my presence, and it is both accurate and complete.       Flavia Orourke M.D.

## 2020-03-13 ENCOUNTER — TELEPHONE (OUTPATIENT)
Dept: CARDIOLOGY CLINIC | Age: 70
End: 2020-03-13

## 2020-03-16 ENCOUNTER — OFFICE VISIT (OUTPATIENT)
Dept: CARDIOLOGY CLINIC | Age: 70
End: 2020-03-16
Payer: MEDICARE

## 2020-03-16 VITALS
DIASTOLIC BLOOD PRESSURE: 82 MMHG | HEART RATE: 91 BPM | BODY MASS INDEX: 21.37 KG/M2 | WEIGHT: 128.4 LBS | SYSTOLIC BLOOD PRESSURE: 118 MMHG

## 2020-03-16 PROCEDURE — 99214 OFFICE O/P EST MOD 30 MIN: CPT | Performed by: INTERNAL MEDICINE

## 2020-03-16 PROCEDURE — 93000 ELECTROCARDIOGRAM COMPLETE: CPT | Performed by: INTERNAL MEDICINE

## 2020-09-01 ENCOUNTER — TELEPHONE (OUTPATIENT)
Dept: CARDIOLOGY CLINIC | Age: 70
End: 2020-09-01

## 2020-09-02 NOTE — TELEPHONE ENCOUNTER
Pt called back stataed she missed a call. Pt was advised of message. Pt would like to know if she can be called when samples come in. Pt can be reached at [phone].  Thanks

## 2020-10-29 ENCOUNTER — TELEPHONE (OUTPATIENT)
Dept: CARDIOLOGY CLINIC | Age: 70
End: 2020-10-29

## 2021-01-15 ENCOUNTER — TELEPHONE (OUTPATIENT)
Dept: CARDIOLOGY CLINIC | Age: 71
End: 2021-01-15

## 2021-01-15 NOTE — TELEPHONE ENCOUNTER
Pt called In requesting samples on medication Eliquis 5mg. Pt can be reached at [phone] to address this matter.  Thanks

## 2021-02-25 ENCOUNTER — TELEPHONE (OUTPATIENT)
Dept: CARDIOLOGY CLINIC | Age: 71
End: 2021-02-25

## 2021-03-04 ENCOUNTER — TELEPHONE (OUTPATIENT)
Dept: CARDIOLOGY CLINIC | Age: 71
End: 2021-03-04

## 2021-03-04 RX ORDER — FUROSEMIDE 40 MG/1
40 TABLET ORAL DAILY
Qty: 120 TABLET | Refills: 3 | Status: SHIPPED | OUTPATIENT
Start: 2021-03-04 | End: 2022-01-14 | Stop reason: SDUPTHER

## 2021-03-04 NOTE — TELEPHONE ENCOUNTER
Requested Prescriptions     Pending Prescriptions Disp Refills    furosemide (LASIX) 40 MG tablet 120 tablet 3     Sig: Take 1 tablet by mouth daily And 1/2 tab prn weight gain > 3 #          Number: 120    Refills: 3    Last Office Visit: 3/16/2020     Next Office Visit: 3/17/2021

## 2021-03-11 NOTE — PROGRESS NOTES
[facility]   Electrophysiology  TELEHEALTH EVALUATION -- Audio/Visual (During EDLHY-10 public health emergency)     Pursuant to the emergency declaration under the [facility] waiver authority and the [employer] Act, this Virtual Visit was conducted, with patient's consent, to reduce the patient's risk of exposure to COVID-19 and provide continuity of care for an established patient. Services were provided through an audio synchronous discussion virtually to substitute for in-person clinic visit. Date: 3/17/2021    Chief Complaint   Patient presents with    Atrial Fibrillation    Palpitations    Cardiomyopathy       Cardiac HX: Jorge Santiago is a 79 y.o. woman with no cardiac history, hospitalized 2/14/18 with AF/RVR, after an URI and abd bloating, found to be hypertensive, with an EF 25% per echo, was not interested in an ischemic chuyita, repeat echo showed an EF of 50-55% (6/2018), did not tolerate hydralazine, Toprol or lisinopril due to \"fogginess\". Attempted to restart meds and patient did not tolerate. Today: Patient is a virtual visit today for follow-up for paroxysmal atrial fibrillation and dilated cardiomyopathy. She states that she occasionally has some palpitations but does not think her heart rate is uncontrolled. A monitor was suggested today however she is not interested. She was supposed to have a sleep study in the past however it was cost prohibitive and she did not pursue it. She has not been able to tolerate Toprol, hydralazine or lisinopril due to fatigue and dizziness. She states that she is very sensitive to any medications. She currently does take her Lasix daily as she notes swelling in her lower extremities if she does not. She has been doing some intermittent fasting and her weight has gone from around 200 down to 129 pounds.   She states that overall she feels very well. Denies chest pain, shortness of breath, PND, orthopnea or lower extremity edema. Home medications:   Current Outpatient Medications on File Prior to Visit   Medication Sig Dispense Refill    HAWTHORN BERRIES PO Take by mouth      furosemide (LASIX) 40 MG tablet Take 1 tablet by mouth daily And 1/2 tab prn weight gain > 3 # 120 tablet 3    Coenzyme Q10 (COQ10 PO) Take 1 capsule by mouth daily      CALCIUM-MAGNESIUM-VITAMIN D PO Take 1 capsule by mouth daily      PANTOTHENIC ACID PO Take 1 capsule by mouth daily      vitamin B-12 (CYANOCOBALAMIN) 500 MCG tablet Take 500 mcg by mouth nightly       No current facility-administered medications on file prior to visit. Past Medical History:   Diagnosis Date    Thyroid disease         Past Surgical History:   Procedure Laterality Date    HAND SURGERY Right     childhood and correction as teenager    HYSTERECTOMY         Allergies   Allergen Reactions    Pcn [Penicillins] Anaphylaxis       Social History:  Reviewed. reports that she has never smoked. She has never used smokeless tobacco. She reports that she does not drink alcohol or use drugs. Family History:  Reviewed. family history includes Coronary Art Dis in her father; Other in her mother. Review of System:    · Constitutional: No fevers, chills. · Eyes: No visual changes or diplopia. No scleral icterus. · ENT: No Headaches. No mouth sores or sore throat. · Cardiovascular: No for chest pain, Yes for dyspnea on exertion, No for palpitations or No for loss of consciousness. No cough, hemoptysis, No for pleuritic pain, or phlebitis. · Respiratory: No for cough or wheezing. No hematemesis. · Gastrointestinal: No abdominal pain, blood in stools. · Genitourinary: No dysuria, or hematuria. · Musculoskeletal: No gait disturbance,    · Integumentary: No rash or pruritis. · Neurological: No headache, change in muscle strength, numbness or tingling.    · Psychiatric: No anxiety, or depression. · Endocrine: No temperature intolerance. No excessive thirst, fluid intake, or urination. · Hem/Lymph: No abnormal bruising or bleeding, blood clots or swollen lymph nodes. · Allergic/Immunologic: No nasal congestion or hives. Physical Examination:  There were no vitals filed for this visit. Wt Readings from Last 3 Encounters:   03/16/20 128 lb 6.4 oz (58.2 kg)   08/12/19 127 lb 3.2 oz (57.7 kg)   02/08/19 131 lb 6.4 oz (59.6 kg)     · Constitutional: Oriented. No distress. · Psychiatric: Has a normal mood, affect and behavior     Physical exam not done as patient was a virtual visit today. Labs:  Reviewed. No results for input(s): NA, K, CL, CO2, PHOS, BUN, CREATININE in the last 72 hours. Invalid input(s): CA,  TSH  No results for input(s): WBC, HGB, HCT, MCV, PLT in the last 72 hours. Lab Results   Component Value Date    TROPONINI <0.01 02/14/2018     Lab Results   Component Value Date     02/28/2018     Lab Results   Component Value Date    PROTIME 12.7 02/14/2018    INR 2.2 06/05/2018    INR 1.12 02/14/2018     Lab Results   Component Value Date    CHOL 225 04/10/2019    CHOL 138 04/10/2019    HDL 87 04/10/2019    TRIG 63 04/10/2019       ECG: Personally reviewed: N/A    ECHO: 6/22/2018  Summary   Normal left ventricular size and wall thickness.   Normal left ventricular systolic function with an estimate ejection fraction   of 50-55%.   There are no regional wall motion abnormalities.   Diastolic function is indeterminate.   The patient's underlying rhythm appears to be atrial fibrillation.   Difficult to assess left ventricular function d/t atrial fibrillation.   Bi-atrial enlargement.   Trace pulmonic regurgitation.   Mild mitral, aortic, and tricuspid regurgitation.   Estimated pulmonary artery systolic pressure is normal at 20 mmHg assuming a   right atrial pressure of 3 mmHg.      2/15/2018  Summary   Left ventricular size is normal. There is mild concentric left ventricular   hypertrophy. Left ventricular function appears moderately reduced with   ejection fraction estimated at 25%. Global hypokinesis of left ventricle.   The diastolic dysfunction is indeterminate due to heart rhythm. The mitral   valve leaflets are slightly thickened with normal leaflet mobility. Mitral   annular calcification is present. Moderate mitral regurgitation is present.  Betsy Abed is mild-moderate tricuspid regurgitation with RVSP estimated at 33   mmHg. Bi-atrial enlargement. There is a trivial circumferential pericardial   effusion noted. No prior echo for comparison. Problem List:   Patient Active Problem List    Diagnosis Date Noted    Acute systolic congestive heart failure ([address] 02/16/2018     Priority: High    Persistent atrial fibrillation ([facility]      Priority: High    Cardiomyopathy [facility])      Priority: High    Atrial fibrillation with RVR ([facility] 02/14/2018     Priority: Low    HTN (hypertension) 02/14/2018     Priority: Low    Immunization refused 05/10/2018    Mammogram declined 05/10/2018    Colonoscopy refused 05/10/2018        Assessment:   1. Paroxysmal atrial fibrillation (HCC)    2. Dyslipidemia    3. Palpitations    4. Dilated cardiomyopathy ([address]    5. Benign essential HTN        Cardiac HX: Melvin Shrestha is a 79 y.o. woman with no cardiac history, hospitalized 2/14/18 with AF/RVR, after an URI and abd bloating, found to be hypertensive, with an EF 25% per echo, was not interested in an ischemic chuytia, repeat echo showed an EF of 50-55% (6/2018), did not tolerate hydralazine, Toprol or lisinopril due to \"fogginess\". Attempted to restart meds and patient did not tolerate. [id number] 4.  TSH 3.03 (2/14/18).     AF/RVR  - Patient has been in persistent atrial fibrillation -no recent EKG available  - Patient will not take BB, hydralazine and lisinopril due to side effects  - LA 4.6, volume 87 ml  - On Eliquis 5 mg BID - no s/s bleeding - continue  - emergency medical treatment and/or call 911 if deemed necessary. Patient identification was verified at the start of the visit: yes     Total time spent on this encounter: 25 minutes     Services were provided through a video synchronous discussion virtually to substitute for in-person clinic visit. Patient and provider were located at their individual homes. --HEIDI Callahan CNP on 9/1/2020 at 5:21 PM     An electronic signature was used to authenticate this note.

## 2021-03-17 ENCOUNTER — VIRTUAL VISIT (OUTPATIENT)
Dept: CARDIOLOGY CLINIC | Age: 71
End: 2021-03-17
Payer: MEDICARE

## 2021-03-17 ENCOUNTER — TELEPHONE (OUTPATIENT)
Dept: CARDIOLOGY CLINIC | Age: 71
End: 2021-03-17

## 2021-03-17 DIAGNOSIS — I42.0 DILATED CARDIOMYOPATHY (HCC): ICD-10-CM

## 2021-03-17 DIAGNOSIS — I10 BENIGN ESSENTIAL HTN: ICD-10-CM

## 2021-03-17 DIAGNOSIS — R00.2 PALPITATIONS: ICD-10-CM

## 2021-03-17 DIAGNOSIS — E78.5 DYSLIPIDEMIA: ICD-10-CM

## 2021-03-17 DIAGNOSIS — I48.0 PAROXYSMAL ATRIAL FIBRILLATION (HCC): Primary | ICD-10-CM

## 2021-03-17 PROCEDURE — 99443 PR PHYS/QHP TELEPHONE EVALUATION 21-30 MIN: CPT | Performed by: NURSE PRACTITIONER

## 2021-03-17 NOTE — TELEPHONE ENCOUNTER
Tried calling patient x4 and phone picks ups then hangs up. Pt has a virtual visit today at 10pm. This is the only number listed.

## 2021-07-02 ENCOUNTER — TELEPHONE (OUTPATIENT)
Dept: CARDIOLOGY CLINIC | Age: 71
End: 2021-07-02

## 2021-09-02 ENCOUNTER — TELEPHONE (OUTPATIENT)
Dept: CARDIOLOGY CLINIC | Age: 71
End: 2021-09-02

## 2021-09-24 ENCOUNTER — TELEPHONE (OUTPATIENT)
Dept: CARDIOLOGY CLINIC | Age: 71
End: 2021-09-24

## 2021-09-24 NOTE — TELEPHONE ENCOUNTER
Called and spoke to pt. She hasn't been seen since March, so I moved up her appt to Monday with Colin Duff NP. She will bring in forms to fax over. All questions answered.

## 2021-09-24 NOTE — TELEPHONE ENCOUNTER
Patient asking for a letter stating she can not attend jury duty due to her health issues. Please put this case # [id number] on letter. Patient needs letter asap.  Please call pt at [phone]

## 2021-09-24 NOTE — PROGRESS NOTES
[address]   Electrophysiology  Office Visit  Date: 9/27/2021    Chief Complaint   Patient presents with    Atrial Fibrillation    Hypertension    Cardiomyopathy       Cardiac HX: Christina Boateng is a 70 y.o.  woman with no cardiac history, hospitalized 2/14/18 with AF/RVR, after an URI and abd bloating, found to be hypertensive, with an EF 25% per echo, was not interested in an ischemic eval, repeat echo showed an EF of 50-55% (6/2018), did not tolerate hydralazine, Toprol or lisinopril due to \"fogginess\". Attempted to restart meds and patient did not tolerate     Interval History/HPI: Patient is here for f/u for AF, CMP, HTN. She is on Eliquis 5 mg BID, no s/sx of bleeding. Today she presents in AF, rates 113. She states she is always in AF. States her heart rate is high because she has been having a lot of anxiety today. Recheck HR 96. She endorses palpitations, heart racing at times. States that she has intermittent episodes of dizziness/lightheadedness when she has not eaten in a while. No CP, SOB, PND, orthopnea, BLE edema. BP well controlled. She continues to do intermittent fasting, states she eats a healthy diet, exercises daily. She has a letter to report for jury duty in 2 weeks. Home medications:   Current Outpatient Medications on File Prior to Visit   Medication Sig Dispense Refill    HAWTHORN BERRIES PO Take by mouth      apixaban (ELIQUIS) 5 MG TABS tablet Take 1 tablet by mouth 2 times daily 60 tablet 5    furosemide (LASIX) 40 MG tablet Take 1 tablet by mouth daily And 1/2 tab prn weight gain > 3 # 120 tablet 3    Coenzyme Q10 (COQ10 PO) Take 1 capsule by mouth daily      CALCIUM-MAGNESIUM-VITAMIN D PO Take 1 capsule by mouth daily      PANTOTHENIC ACID PO Take 1 capsule by mouth daily      vitamin B-12 (CYANOCOBALAMIN) 500 MCG tablet Take 500 mcg by mouth nightly       No current facility-administered medications on file prior to visit.        Past Medical History:   Diagnosis Date    Thyroid disease         Past Surgical History:   Procedure Laterality Date    HAND SURGERY Right     childhood and correction as teenager    HYSTERECTOMY         Family History:  Reviewed. family history includes Coronary Art Dis in her father; Other in her mother. Review of System:    · Constitutional: No fevers, chills. · Eyes: No visual changes or diplopia. No scleral icterus. · ENT: No Headaches. No mouth sores or sore throat. · Cardiovascular: No for chest pain, No for dyspnea on exertion, Yes for palpitations or No for loss of consciousness. No cough, hemoptysis, No for pleuritic pain, or phlebitis. · Respiratory: No for cough or wheezing. No hematemesis. · Gastrointestinal: No abdominal pain, blood in stools. · Genitourinary: No dysuria, or hematuria. · Musculoskeletal: No gait disturbance,    · Integumentary: No rash or pruritis. · Neurological: No headache, change in muscle strength, numbness or tingling. · Psychiatric: No anxiety, or depression. · Endocrine: No temperature intolerance. No excessive thirst, fluid intake, or urination. · Hem/Lymph: No abnormal bruising or bleeding, blood clots or swollen lymph nodes. · Allergic/Immunologic: No nasal congestion or hives. Physical Examination:  Vitals:    09/27/21 1330   BP:    Pulse: 96         Wt Readings from Last 3 Encounters:   09/27/21 130 lb 6.4 oz (59.1 kg)   03/16/20 128 lb 6.4 oz (58.2 kg)   08/12/19 127 lb 3.2 oz (57.7 kg)       · Constitutional: Oriented. No distress. · Head: Normocephalic and atraumatic. · Mouth/Throat: Oropharynx is clear and moist.   · Eyes: Conjunctivae clear without jaunduice. PERRL. · Neck: Neck supple. No rigidity. No JVD present. · Cardiovascular: Normal rate, regular rhythm, S1&S2. · Pulmonary/Chest: Bilateral respiratory sounds. No wheezes, No rhonchi. · Abdominal: Soft. Bowel sounds present. No distension, No tenderness.    · Musculoskeletal: No tenderness. No edema    · Lymphadenopathy: Has no cervical adenopathy. · Neurological: Alert and oriented. Cranial nerve appears intact, No Gross deficit   · Skin: Skin is warm and dry. No rash noted. · Psychiatric: Has a normal mood, affect and behavior     Labs:  Reviewed. No results for input(s): NA, K, CL, CO2, PHOS, BUN, CREATININE in the last 72 hours. Invalid input(s): CA,  TSH  No results for input(s): WBC, HGB, HCT, MCV, PLT in the last 72 hours. Lab Results   Component Value Date    TROPONINI <0.01 02/14/2018     Lab Results   Component Value Date     02/28/2018     Lab Results   Component Value Date    PROTIME 12.7 02/14/2018    INR 2.2 06/05/2018    INR 1.12 02/14/2018     Lab Results   Component Value Date    CHOL 225 04/10/2019    CHOL 138 04/10/2019    HDL 87 04/10/2019    TRIG 63 04/10/2019       ECG: Personally reviewed:  AF, , QRS 98, QTc 432    ECHO:  6.22.2018   Summary   Normal left ventricular size and wall thickness. Normal left ventricular systolic function with an estimate ejection fraction   of 50-55%. There are no regional wall motion abnormalities. Diastolic function is indeterminate. The patient's underlying rhythm appears to be atrial fibrillation. Difficult to assess left ventricular function d/t atrial fibrillation. Bi-atrial enlargement. Trace pulmonic regurgitation. Mild mitral, aortic, and tricuspid regurgitation. Estimated pulmonary artery systolic pressure is normal at 20 mmHg assuming a   right atrial pressure of 3 mmHg.     Stress Test: N/A    Cardiac Angiography: N/A    Problem List:   Patient Active Problem List    Diagnosis Date Noted    Acute systolic congestive heart failure ([address] 02/16/2018    Persistent atrial fibrillation ([address]     Cardiomyopathy ([address]     Immunization refused 05/10/2018    Mammogram declined 05/10/2018    Colonoscopy refused 05/10/2018    Atrial fibrillation with RVR [facility] 02/14/2018    HTN (hypertension) 02/14/2018        Assessment:   1. Longstanding persistent atrial fibrillation ([facility]    2. Dilated cardiomyopathy ([facility]    3. Essential hypertension          Cardiac HX: Monika Vicente is a 70 y.o. woman with PMH AF, went into AF/RVR (2/2018), after an URI and abd bloating, found to be hypertensive, with an EF 25% per echo, was not interested in an ischemic eval, repeat echo showed an EF of 50-55% (6/2018), did not tolerate hydralazine, Toprol or lisinopril due to \"fogginess\". Attempted to restart meds and patient did not tolerate. [id number] 4. TSH 3.03 (2/14/18). Persistent AF  - In AF today  - Patient will not take BB, hydralazine and lisinopril due to side effects  - LA 4.6, volume 87 ml  - On Eliquis 5 mg BID - no s/s bleeding - continue  - Sleep study was cost prohibitive and patient not interested  - 24 hour holter monitor showed a min HR of 41, avg of 78 and a max of 174  - Repeat monitor if symptoms - patient is not interested in wearing at this point.  - Lifestyle modification - continue diet and exercise  - Will order BMP, CBC -has not had labs since 2019  - ECG ordered and results personally reviewed      HTN  - /70  - Not on any meds currently  - Did not tolerate hydralazine, Toprol, lisinopril     CMP  - EF 25%, now 50-55%   - NYHA class I/II  - QRS 98  - No ACE, BB due to patient intolerance  - On lasix 40 mg QD  - Not interested in ischemic work up at this time     F/u with NP in 6 months     + systolic HF, EF 13%, now 58%  No known CAD  Anticoagulation for AF  No tobacco use. All questions and concerns were addressed to the patient/family. Alternatives to my treatment were discussed. The note was completed using EMR. Every effort was made to ensure accuracy; however, inadvertent computerized transcription errors may be present. Patient received education regarding their diagnosis, treatment and medications while in the office today.       rAian Concepcion, [address] [facility] Estephania Alfaro [address]

## 2021-09-27 ENCOUNTER — OFFICE VISIT (OUTPATIENT)
Dept: CARDIOLOGY CLINIC | Age: 71
End: 2021-09-27
Payer: MEDICARE

## 2021-09-27 VITALS
WEIGHT: 130.4 LBS | BODY MASS INDEX: 23.11 KG/M2 | HEIGHT: 63 IN | DIASTOLIC BLOOD PRESSURE: 70 MMHG | HEART RATE: 96 BPM | SYSTOLIC BLOOD PRESSURE: 112 MMHG

## 2021-09-27 DIAGNOSIS — I10 ESSENTIAL HYPERTENSION: ICD-10-CM

## 2021-09-27 DIAGNOSIS — I42.0 DILATED CARDIOMYOPATHY (HCC): ICD-10-CM

## 2021-09-27 DIAGNOSIS — I48.11 LONGSTANDING PERSISTENT ATRIAL FIBRILLATION (HCC): Primary | ICD-10-CM

## 2021-09-27 PROCEDURE — 99214 OFFICE O/P EST MOD 30 MIN: CPT | Performed by: NURSE PRACTITIONER

## 2021-09-27 PROCEDURE — 93000 ELECTROCARDIOGRAM COMPLETE: CPT | Performed by: NURSE PRACTITIONER

## 2021-09-27 NOTE — LETTER
[address] Cardiology [address]  Phone: [phone]  Fax: [phone]    HEIDI Flores CNP        September 27, 2021     Patient: Risa Bravo   YOB: 1950   Date of Visit: 9/27/2021       To Whom it May Concern:    Vel Mojica was seen in my clinic on 9/27/2021. She is physically unable to serve jury duty to to heart conditions. If you have any questions or concerns, please don't hesitate to call.     Sincerely,         HEIDI Flores CNP

## 2021-10-14 ENCOUNTER — TELEPHONE (OUTPATIENT)
Dept: CARDIOLOGY CLINIC | Age: 71
End: 2021-10-14

## 2021-10-14 NOTE — TELEPHONE ENCOUNTER
The patient called requesting Eliquis 5 mg samples. Please call the patient at [phone] to let her know if samples are available.

## 2021-11-30 ENCOUNTER — TELEPHONE (OUTPATIENT)
Dept: CARDIOLOGY CLINIC | Age: 71
End: 2021-11-30

## 2021-11-30 NOTE — TELEPHONE ENCOUNTER
The patient called requesting Eliquis 5 mg samples. Please call the patient at [phone] to let her know if samples are available.

## 2022-01-14 RX ORDER — FUROSEMIDE 40 MG/1
40 TABLET ORAL DAILY
Qty: 120 TABLET | Refills: 3 | Status: SHIPPED | OUTPATIENT
Start: 2022-01-14 | End: 2022-09-29

## 2022-01-14 NOTE — TELEPHONE ENCOUNTER
Medication: Furesomide     Dosage: 40 MG    Number: 120    Refills: 3    Last Office Visit: 09/27/21    Next Office Visit: 09/22/2022    Last Refill: 3/04/2021    Last Labs: labs ordered, not done

## 2022-01-18 ENCOUNTER — TELEPHONE (OUTPATIENT)
Dept: CARDIOLOGY CLINIC | Age: 72
End: 2022-01-18

## 2022-03-07 ENCOUNTER — TELEPHONE (OUTPATIENT)
Dept: CARDIOLOGY CLINIC | Age: 72
End: 2022-03-07

## 2022-03-07 NOTE — TELEPHONE ENCOUNTER
The patient called requesting Eliquis 5 mg samples.  Please call the patient at [phone] to let her know if samples are available.

## 2022-04-21 ENCOUNTER — TELEPHONE (OUTPATIENT)
Dept: CARDIOLOGY CLINIC | Age: 72
End: 2022-04-21

## 2022-04-21 NOTE — TELEPHONE ENCOUNTER
The patient called requesting Eliquis 5 mg samples.  Please call the patient at [phone] to let her know if samples are available.

## 2022-05-25 ENCOUNTER — TELEPHONE (OUTPATIENT)
Dept: CARDIOLOGY CLINIC | Age: 72
End: 2022-05-25

## 2022-06-15 NOTE — TELEPHONE ENCOUNTER
Unable to leave message for lab request.    [facility] has not been set up and home line is busy, tried multiple times No

## 2022-07-15 ENCOUNTER — TELEPHONE (OUTPATIENT)
Dept: CARDIOLOGY CLINIC | Age: 72
End: 2022-07-15

## 2022-09-15 ENCOUNTER — TELEPHONE (OUTPATIENT)
Dept: CARDIOLOGY CLINIC | Age: 72
End: 2022-09-15

## 2022-09-15 NOTE — TELEPHONE ENCOUNTER
Attempted to contact pt and let her know that samples will be placed up front for her to . VM was full and unable to hold any more messages.

## 2022-09-20 NOTE — PROGRESS NOTES
Thyroid disease         Past Surgical History:   Procedure Laterality Date    HAND SURGERY Right     childhood and correction as teenager    HYSTERECTOMY (CERVIX STATUS UNKNOWN)         Family History:  Reviewed. family history includes Coronary Art Dis in her father; Other in her mother. Review of System:    Constitutional: No fevers, chills. Eyes: No visual changes or diplopia. No scleral icterus. ENT: No Headaches. No mouth sores or sore throat. Cardiovascular: No for chest pain, No for dyspnea on exertion, Yes for palpitations or No for loss of consciousness. No cough, hemoptysis, No for pleuritic pain, or phlebitis. Respiratory: No for cough or wheezing. No hematemesis. Gastrointestinal: No abdominal pain, blood in stools. Genitourinary: No dysuria, or hematuria. Musculoskeletal: No gait disturbance,    Integumentary: No rash or pruritis. Neurological: No headache, change in muscle strength, numbness or tingling. Psychiatric: No anxiety, or depression. Endocrine: No temperature intolerance. No excessive thirst, fluid intake, or urination. Hem/Lymph: No abnormal bruising or bleeding, blood clots or swollen lymph nodes. Allergic/Immunologic: No nasal congestion or hives. Physical Examination:  There were no vitals filed for this visit. Wt Readings from Last 3 Encounters:   09/22/22 148 lb (67.1 kg)   09/27/21 130 lb 6.4 oz (59.1 kg)   03/16/20 128 lb 6.4 oz (58.2 kg)       Constitutional: Oriented. No distress. Head: Normocephalic and atraumatic. Mouth/Throat: Oropharynx is clear and moist.   Eyes: Conjunctivae clear without jaunduice. PERRL. Neck: Neck supple. No rigidity. No JVD present. Cardiovascular: Normal rate, regular rhythm, S1&S2. Pulmonary/Chest: Bilateral respiratory sounds. No wheezes, No rhonchi. Abdominal: Soft. Bowel sounds present. No distension, No tenderness. Musculoskeletal: No tenderness. No edema    Lymphadenopathy: Has no cervical adenopathy. Neurological: Alert and oriented. Cranial nerve appears intact, No Gross deficit   Skin: Skin is warm and dry. No rash noted. Psychiatric: Has a normal mood, affect and behavior     Labs:  Reviewed. No results for input(s): NA, K, CL, CO2, PHOS, BUN, CREATININE, CA in the last 72 hours. Invalid input(s):  TSH  No results for input(s): WBC, HGB, HCT, MCV, PLT in the last 72 hours. Lab Results   Component Value Date/Time    TROPONINI <0.01 02/14/2018 11:44 PM     Lab Results   Component Value Date/Time     02/28/2018 09:36 AM     Lab Results   Component Value Date/Time    PROTIME 12.7 02/14/2018 08:35 PM    INR 2.2 06/05/2018 06:58 AM    INR 1.12 02/14/2018 08:35 PM     Lab Results   Component Value Date/Time    CHOL 225 04/10/2019 10:21 AM    CHOL 138 04/10/2019 10:21 AM    HDL 87 04/10/2019 10:21 AM    TRIG 63 04/10/2019 10:21 AM       ECG: Personally reviewed: AF,     ECHO:  6.22.2018   Summary   Normal left ventricular size and wall thickness. Normal left ventricular systolic function with an estimate ejection fraction   of 50-55%. There are no regional wall motion abnormalities. Diastolic function is indeterminate. The patient's underlying rhythm appears to be atrial fibrillation. Difficult to assess left ventricular function d/t atrial fibrillation. Bi-atrial enlargement. Trace pulmonic regurgitation. Mild mitral, aortic, and tricuspid regurgitation. Estimated pulmonary artery systolic pressure is normal at 20 mmHg assuming a   right atrial pressure of 3 mmHg.     Stress Test: N/A    Cardiac Angiography: N/A    Problem List:   Patient Active Problem List    Diagnosis Date Noted    Acute systolic congestive heart failure ([address] 02/16/2018    Persistent atrial fibrillation [address] [address]     Cardiomyopathy [address] [address]     Immunization refused 05/10/2018    Mammogram declined 05/10/2018    Colonoscopy refused 05/10/2018    Atrial fibrillation with RVR ([facility] [address] 02/14/2018    HTN (hypertension) [facility]

## 2022-09-22 ENCOUNTER — OFFICE VISIT (OUTPATIENT)
Dept: CARDIOLOGY CLINIC | Age: 72
End: 2022-09-22
Payer: MEDICARE

## 2022-09-22 VITALS
HEART RATE: 128 BPM | SYSTOLIC BLOOD PRESSURE: 110 MMHG | BODY MASS INDEX: 26.22 KG/M2 | DIASTOLIC BLOOD PRESSURE: 76 MMHG | WEIGHT: 148 LBS

## 2022-09-22 DIAGNOSIS — Z79.01 ON CONTINUOUS ORAL ANTICOAGULATION: ICD-10-CM

## 2022-09-22 DIAGNOSIS — I48.11 LONGSTANDING PERSISTENT ATRIAL FIBRILLATION (HCC): ICD-10-CM

## 2022-09-22 DIAGNOSIS — I48.19 PERSISTENT ATRIAL FIBRILLATION (HCC): Primary | ICD-10-CM

## 2022-09-22 DIAGNOSIS — I42.0 DILATED CARDIOMYOPATHY (HCC): ICD-10-CM

## 2022-09-22 DIAGNOSIS — I10 BENIGN ESSENTIAL HTN: ICD-10-CM

## 2022-09-22 LAB
ANION GAP SERPL CALCULATED.3IONS-SCNC: 18 MMOL/L (ref 3–16)
BUN BLDV-MCNC: 28 MG/DL (ref 7–20)
CALCIUM SERPL-MCNC: 10.4 MG/DL (ref 8.3–10.6)
CHLORIDE BLD-SCNC: 91 MMOL/L (ref 99–110)
CO2: 28 MMOL/L (ref 21–32)
CREAT SERPL-MCNC: 1.6 MG/DL (ref 0.6–1.2)
GFR AFRICAN AMERICAN: 38
GFR NON-AFRICAN AMERICAN: 32
GLUCOSE BLD-MCNC: 93 MG/DL (ref 70–99)
HCT VFR BLD CALC: 41.6 % (ref 36–48)
HEMOGLOBIN: 14 G/DL (ref 12–16)
MCH RBC QN AUTO: 30.8 PG (ref 26–34)
MCHC RBC AUTO-ENTMCNC: 33.7 G/DL (ref 31–36)
MCV RBC AUTO: 91.4 FL (ref 80–100)
PDW BLD-RTO: 15 % (ref 12.4–15.4)
PLATELET # BLD: 185 K/UL (ref 135–450)
PMV BLD AUTO: 8.7 FL (ref 5–10.5)
POTASSIUM SERPL-SCNC: 4 MMOL/L (ref 3.5–5.1)
RBC # BLD: 4.55 M/UL (ref 4–5.2)
SODIUM BLD-SCNC: 137 MMOL/L (ref 136–145)
WBC # BLD: 4.3 K/UL (ref 4–11)

## 2022-09-22 PROCEDURE — 99214 OFFICE O/P EST MOD 30 MIN: CPT | Performed by: NURSE PRACTITIONER

## 2022-09-22 PROCEDURE — 93000 ELECTROCARDIOGRAM COMPLETE: CPT | Performed by: NURSE PRACTITIONER

## 2022-09-22 PROCEDURE — 1123F ACP DISCUSS/DSCN MKR DOCD: CPT | Performed by: NURSE PRACTITIONER

## 2022-09-28 NOTE — TELEPHONE ENCOUNTER
Jasiel Horn   Requested Prescriptions     Pending Prescriptions Disp Refills    furosemide (LASIX) 40 MG tablet [Pharmacy Med Name: FUROSEMIDE 40 MG TABLET] 135 tablet 3     Sig: TAKE 1 TABLET BY MOUTH DAILY AND 1/2 TABLET AS NEEDED FOR WEIGHT GAIN GREATER THAN 3 POUNDS AS DIRECTED              Last Office Visit: 9/22/2022     Next Office Visit: 3/14/2023         Last Labs: [phone]

## 2022-09-29 RX ORDER — FUROSEMIDE 40 MG/1
TABLET ORAL
Qty: 135 TABLET | Refills: 3 | Status: SHIPPED | OUTPATIENT
Start: 2022-09-29

## 2022-11-02 ENCOUNTER — TELEPHONE (OUTPATIENT)
Dept: CARDIOLOGY CLINIC | Age: 72
End: 2022-11-02

## 2022-11-02 NOTE — TELEPHONE ENCOUNTER
Pt. Wants to know if she can come in for samples of  apixaban (ELIQUIS) 5 MG TABS tablet     Pt wants a call back at home at [phone]

## 2022-12-15 ENCOUNTER — TELEPHONE (OUTPATIENT)
Dept: CARDIOLOGY CLINIC | Age: 72
End: 2022-12-15

## 2022-12-15 NOTE — TELEPHONE ENCOUNTER
Patient called regarding receiving samples of apixaban (ELIQUIS) 5 MG TABS tablet [[id number]]     Order Details  Dose: 5 mg Route: Oral Frequency: 2 TIMES DAILY   Dispense Quantity: 60 tablet Refills: 5          Sig: Take 1 tablet by mouth 2 times daily     Patient wants a call back at [phone]

## 2023-01-11 ENCOUNTER — TELEPHONE (OUTPATIENT)
Dept: CARDIOLOGY CLINIC | Age: 73
End: 2023-01-11

## 2023-01-11 NOTE — TELEPHONE ENCOUNTER
Patient called and stated that [facility] no longer accepts [facility] delivering to them. Patient needs her prescriptions sent to [facility]. Patient stated someone had to call [facility] and have them to send the information. Patient stated that she just picked up one of her medications but still have 2 left. Patient stated she wanted her prescriptions mailed to her.     [phone] office  [phone]

## 2023-01-11 NOTE — TELEPHONE ENCOUNTER
Attempted to contact pt at both number listed. Needed to see what medication she was still in need of. Both number listed were busy and pt was unable to be reached.

## 2023-01-11 NOTE — TELEPHONE ENCOUNTER
Zoya patient does not need prescriptions at this time, she was just calling to inform us that [employer] is no longer being accepted at [facility] and that she will have all her prescriptions sent to [facility] in the future. She takes Furosimide but has enough for now. Thanks.

## 2023-01-26 ENCOUNTER — TELEPHONE (OUTPATIENT)
Dept: CARDIOLOGY CLINIC | Age: 73
End: 2023-01-26

## 2023-03-07 ENCOUNTER — TELEPHONE (OUTPATIENT)
Dept: CARDIOLOGY CLINIC | Age: 73
End: 2023-03-07

## 2023-03-07 NOTE — TELEPHONE ENCOUNTER
Called patient back, no answer and no voicemail set up. We are currently out of samples. Patient has an appointment next week with Dr. Mustapha Odonnell.

## 2023-03-07 NOTE — TELEPHONE ENCOUNTER
The patient  called requesting samples of Eliquis 5 mg. Please call the patient at [phone] to let her know if samples are available.

## 2023-03-23 ENCOUNTER — TELEPHONE (OUTPATIENT)
Dept: CARDIOLOGY CLINIC | Age: 73
End: 2023-03-23

## 2023-03-23 NOTE — TELEPHONE ENCOUNTER
The patient  called requesting samples of Eliquis 5 mg. Please call the patient at [phone] to let her know if samples are available.

## 2023-05-25 ENCOUNTER — TELEPHONE (OUTPATIENT)
Dept: CARDIOLOGY CLINIC | Age: 73
End: 2023-05-25

## 2023-06-23 ENCOUNTER — TELEPHONE (OUTPATIENT)
Dept: CARDIOLOGY CLINIC | Age: 73
End: 2023-06-23

## 2023-08-10 ENCOUNTER — TELEPHONE (OUTPATIENT)
Dept: CARDIOLOGY CLINIC | Age: 73
End: 2023-08-10

## 2023-09-14 ENCOUNTER — TELEPHONE (OUTPATIENT)
Dept: CARDIOLOGY CLINIC | Age: 73
End: 2023-09-14

## 2023-10-26 ENCOUNTER — TELEPHONE (OUTPATIENT)
Dept: CARDIOLOGY CLINIC | Age: 73
End: 2023-10-26

## 2024-02-29 ENCOUNTER — TELEPHONE (OUTPATIENT)
Dept: CARDIOLOGY CLINIC | Age: 74
End: 2024-02-29

## 2024-03-26 RX ORDER — FUROSEMIDE 40 MG/1
TABLET ORAL
Qty: 60 TABLET | Refills: 3 | Status: SHIPPED | OUTPATIENT
Start: 2024-03-26

## 2024-04-25 ENCOUNTER — TELEPHONE (OUTPATIENT)
Dept: CARDIOLOGY CLINIC | Age: 74
End: 2024-04-25

## 2024-04-25 NOTE — TELEPHONE ENCOUNTER
Patient last seen 09/22/2022.    Patient needs to come in for an office visit to receive samples of Eliquis sample.

## 2024-04-25 NOTE — TELEPHONE ENCOUNTER
Spoke with patient and offered a sooner appt as early as tomorrow 04/26/2024 with JN or next week. Pt declines and states she will have the money for a copay in 06/2024.

## 2024-05-23 NOTE — TELEPHONE ENCOUNTER
Patient will  Eliquis samples today.     Requested Prescriptions     Pending Prescriptions Disp Refills    apixaban (ELIQUIS) 5 MG TABS tablet 28 tablet 0     Sig: Take 1 tablet by mouth 2 times daily          Last Office Visit: 9/22/2022     Next Office Visit: 6/6/2024

## 2024-05-29 NOTE — PROGRESS NOTES
[facility]   Electrophysiology  Office Note        Date: 6/6/2024    Chief Complaint   Patient presents with    Atrial Fibrillation    Tachycardia    Hypertension    Cardiomyopathy    Congestive Heart Failure    Chest Pain    Dizziness    Shortness of Breath     Cardiac HX: Charlotte Mccann is a 73 y.o. woman w/ a h/o HTN, anxiety, pAF (2/2018), CMP, echo (2/2018) EF 25-30%, refused ischemic eval, echo (6/2018) EF 50-55%, placed on GDMT, did not tolerate hydralazine, Toprol and lisinopril d/t \"fogginess\", lost to f/u.     Interval Hx/HPI: Patient is here for follow-up for paroxysmal AF and a dilated cardiomyopathy.  Patient was originally seen in 2018 after she presented to the hospital with complaints of an upper respiratory infection, abdominal bloating and was found to be in atrial fibrillation with RVR along with being hypertensive.  She had an echo that showed her EF was 25%.  She was not interested in ischemic evaluation at that time.  She was placed on hydralazine, Toprol and lisinopril however did not tolerate the medications due to fogginess.  She had a repeat echo in June 2018 that showed her EF improved to 50 to 55%.  Patient was last seen in the office in 2022.  She has had a sleep study ordered in the past however it was cost prohibitive and she did not pursue it.  At her last office visit she was in AF with poorly controlled ventricular rates.  She had a long discussion with the nurse practitioner regarding her atrial fibrillation, poorly controlled ventricular rates and her symptoms.  She was not interested in wearing a monitor, getting an echo or starting on any medication to address her heart rate.  She was aware of the long-term effects of not controlling her AF over time and patient was not concerned.  Patient had multiple follow-up appointment scheduled however she is canceled every appointment.  Today she presents in AF w/ a HR of 144.  Patient states that her heart rate is elevated

## 2024-06-06 ENCOUNTER — OFFICE VISIT (OUTPATIENT)
Dept: CARDIOLOGY CLINIC | Age: 74
End: 2024-06-06
Payer: MEDICARE

## 2024-06-06 ENCOUNTER — NURSE ONLY (OUTPATIENT)
Dept: CARDIOLOGY CLINIC | Age: 74
End: 2024-06-06

## 2024-06-06 VITALS
HEART RATE: 144 BPM | BODY MASS INDEX: 25.3 KG/M2 | WEIGHT: 142.8 LBS | DIASTOLIC BLOOD PRESSURE: 76 MMHG | SYSTOLIC BLOOD PRESSURE: 124 MMHG

## 2024-06-06 DIAGNOSIS — I48.0 PAROXYSMAL ATRIAL FIBRILLATION (HCC): ICD-10-CM

## 2024-06-06 DIAGNOSIS — D68.69 SECONDARY HYPERCOAGULABLE STATE (HCC): ICD-10-CM

## 2024-06-06 DIAGNOSIS — E78.2 MIXED HYPERLIPIDEMIA: ICD-10-CM

## 2024-06-06 DIAGNOSIS — I48.19 PERSISTENT ATRIAL FIBRILLATION (HCC): ICD-10-CM

## 2024-06-06 DIAGNOSIS — I48.19 PERSISTENT ATRIAL FIBRILLATION (HCC): Primary | ICD-10-CM

## 2024-06-06 DIAGNOSIS — E87.6 HYPOKALEMIA: Primary | ICD-10-CM

## 2024-06-06 DIAGNOSIS — I42.0 DILATED CARDIOMYOPATHY (HCC): ICD-10-CM

## 2024-06-06 LAB
ALBUMIN SERPL-MCNC: 4.8 G/DL (ref 3.4–5)
ALP SERPL-CCNC: 51 U/L (ref 40–129)
ALT SERPL-CCNC: 13 U/L (ref 10–40)
ANION GAP SERPL CALCULATED.3IONS-SCNC: 21 MMOL/L (ref 3–16)
AST SERPL-CCNC: 26 U/L (ref 15–37)
BILIRUB DIRECT SERPL-MCNC: <0.2 MG/DL (ref 0–0.3)
BILIRUB INDIRECT SERPL-MCNC: NORMAL MG/DL (ref 0–1)
BILIRUB SERPL-MCNC: 0.7 MG/DL (ref 0–1)
CALCIUM SERPL-MCNC: 10.2 MG/DL (ref 8.3–10.6)
CHLORIDE SERPL-SCNC: 90 MMOL/L (ref 99–110)
CHOLEST SERPL-MCNC: 303 MG/DL (ref 0–199)
CO2 SERPL-SCNC: 26 MMOL/L (ref 21–32)
CREAT SERPL-MCNC: 1.5 MG/DL (ref 0.6–1.2)
DEPRECATED RDW RBC AUTO: 15.6 % (ref 12.4–15.4)
GFR SERPLBLD CREATININE-BSD FMLA CKD-EPI: 36 ML/MIN/{1.73_M2}
GLUCOSE SERPL-MCNC: 82 MG/DL (ref 70–99)
HCT VFR BLD AUTO: 42.8 % (ref 36–48)
HDLC SERPL-MCNC: 102 MG/DL (ref 40–60)
HGB BLD-MCNC: 14.4 G/DL (ref 12–16)
LDLC SERPL CALC-MCNC: 178 MG/DL
MAGNESIUM SERPL-MCNC: 2.2 MG/DL (ref 1.8–2.4)
MCH RBC QN AUTO: 30 PG (ref 26–34)
MCHC RBC AUTO-ENTMCNC: 33.6 G/DL (ref 31–36)
MCV RBC AUTO: 89.3 FL (ref 80–100)
NT-PROBNP SERPL-MCNC: 1706 PG/ML (ref 0–124)
PLATELET # BLD AUTO: 175 K/UL (ref 135–450)
PMV BLD AUTO: 9 FL (ref 5–10.5)
POTASSIUM SERPL-SCNC: 3.1 MMOL/L (ref 3.5–5.1)
PROT SERPL-MCNC: 7.5 G/DL (ref 6.4–8.2)
RBC # BLD AUTO: 4.79 M/UL (ref 4–5.2)
SODIUM SERPL-SCNC: 137 MMOL/L (ref 136–145)
TRIGL SERPL-MCNC: 114 MG/DL (ref 0–150)
TSH SERPL DL<=0.005 MIU/L-ACNC: 1.83 UIU/ML (ref 0.27–4.2)
VLDLC SERPL CALC-MCNC: 23 MG/DL
WBC # BLD AUTO: 7.3 K/UL (ref 4–11)

## 2024-06-06 PROCEDURE — 3078F DIAST BP <80 MM HG: CPT | Performed by: NURSE PRACTITIONER

## 2024-06-06 PROCEDURE — 93000 ELECTROCARDIOGRAM COMPLETE: CPT | Performed by: NURSE PRACTITIONER

## 2024-06-06 PROCEDURE — 1123F ACP DISCUSS/DSCN MKR DOCD: CPT | Performed by: NURSE PRACTITIONER

## 2024-06-06 PROCEDURE — 3074F SYST BP LT 130 MM HG: CPT | Performed by: NURSE PRACTITIONER

## 2024-06-06 PROCEDURE — 99215 OFFICE O/P EST HI 40 MIN: CPT | Performed by: NURSE PRACTITIONER

## 2024-06-06 RX ORDER — VITAMIN B COMPLEX
1 CAPSULE ORAL DAILY
COMMUNITY

## 2024-06-06 NOTE — PROGRESS NOTES
Monitor placed by MH  Monitor company [employer]  Length of monitor 7  Monitor ordered by   Serial number/Patch ID [id number]  Activation successful prior to pt leaving office? Yes

## 2024-06-10 RX ORDER — POTASSIUM CHLORIDE 20 MEQ/1
20 TABLET, EXTENDED RELEASE ORAL 2 TIMES DAILY
Qty: 180 TABLET | Refills: 1 | Status: SHIPPED | OUTPATIENT
Start: 2024-06-10

## 2024-06-21 ENCOUNTER — TELEPHONE (OUTPATIENT)
Dept: CARDIOLOGY CLINIC | Age: 74
End: 2024-06-21

## 2024-06-21 NOTE — TELEPHONE ENCOUNTER
Patient would like to  samples of Eliquis 5mg.   Can reach pt at [phone] when meds are up front.     Medication  apixaban (ELIQUIS) 5 MG TABS tablet [[id number]]  apixaban (ELIQUIS) 5 MG TABS tablet [[id number]]    Order Details  Dose: 5 mg Route: Oral Frequency: 2 TIMES DAILY   Dispense Quantity: 28 tablet Refills: 0    Note to Pharmacy: [id number]  exp 9/2025  2 boxes         Sig: Take 1 tablet by mouth 2 times daily

## 2024-06-26 DIAGNOSIS — E61.1 LOW IRON: Primary | ICD-10-CM

## 2024-07-12 ENCOUNTER — TELEPHONE (OUTPATIENT)
Dept: CARDIOLOGY CLINIC | Age: 74
End: 2024-07-12

## 2024-07-12 NOTE — TELEPHONE ENCOUNTER
Pt calling for samples of Eliquis 5mg      Medication  apixaban (ELIQUIS) 5 MG TABS tablet [[id number]]  apixaban (ELIQUIS) 5 MG TABS tablet [[id number]]    Order Details  Dose: 5 mg Route: Oral Frequency: 2 TIMES DAILY   Dispense Quantity: 28 tablet Refills: 0    Note to Pharmacy: [id number]  exp 9/2025  2 boxes         Sig: Take 1 tablet by mouth 2 times daily

## 2024-07-19 DIAGNOSIS — I48.91 ATRIAL FIBRILLATION WITH RVR (HCC): ICD-10-CM

## 2024-07-19 DIAGNOSIS — I48.91 ATRIAL FIBRILLATION WITH RVR (HCC): Primary | ICD-10-CM

## 2024-07-19 NOTE — TELEPHONE ENCOUNTER
Pt picked up sample and reapply for the assistance program. Will ask FW to sign paper rx on Monday to submit with application.

## 2024-07-19 NOTE — PROGRESS NOTES
Last OV 6/2024:  AF/RVR  - In AF/RVR -, states it runs 80 at home  - On Eliquis 5 mg BID - no s/s bleeding - continue, only getting samples - will see if she qualifies for the patient assistance program  - Refuses BB, hydralazine and lisinopril d/t s/e  - Will start short acting dilt as she would like to cut in 1/2  - pending monitor  - Reviewed risk factors, pathophysiology, treatment options and lifestyle modification for atrial fibrillation: Blood pressure control, blood sugar control, healthy diet, minimal alcohol intake, no smoking, activity and exercise, manage stress sleep apnea evaluation and symptoms of a stroke.  - Reviewed recent labs  - Sleep study - not interested  - 1 week CAM to assess overall burden  - Echo - will check stat  - F/u NP in 6 to 8 weeks  - F/u with Dr. Benito 4-5 months  - ECG ordered and results personally reviewed      Patient applying for assistance per . Script will be sent with patient assistance forms.

## 2024-07-19 NOTE — TELEPHONE ENCOUNTER
Spoke with pt and she states she did not get approved for the assistance program. There were no proof of prescriptions on file, last script she states she had was in 2018.     She is on her way to  sample now.

## 2024-07-19 NOTE — PROGRESS NOTES
Last OV 6/2024:  AF/RVR  - In AF/RVR -, states it runs 80 at home  - On Eliquis 5 mg BID - no s/s bleeding - continue, only getting samples - will see if she qualifies for the patient assistance program  - Refuses BB, hydralazine and lisinopril d/t s/e  - Will start short acting dilt as she would like to cut in 1/2  - pending monitor  - Reviewed risk factors, pathophysiology, treatment options and lifestyle modification for atrial fibrillation: Blood pressure control, blood sugar control, healthy diet, minimal alcohol intake, no smoking, activity and exercise, manage stress sleep apnea evaluation and symptoms of a stroke.  - Reviewed recent labs  - Sleep study - not interested  - 1 week CAM to assess overall burden  - Echo - will check stat  - F/u NP in 6 to 8 weeks  - F/u with Dr. Benito 4-5 months  - ECG ordered and results personally reviewed

## 2024-07-26 ENCOUNTER — TELEPHONE (OUTPATIENT)
Dept: CARDIOLOGY CLINIC | Age: 74
End: 2024-07-26

## 2024-07-26 NOTE — TELEPHONE ENCOUNTER
----- Message from HEIDI Hannah CNP sent at 7/25/2024  2:10 PM EDT -----  Patient needs to have her echo before she sees me in the office in August.  She canceled the first echo.  She will need to get this rescheduled.  She will probably need help in getting this scheduled.  She also needs to have repeat lab work done as her potassium was low at 3.1.

## 2024-07-26 NOTE — TELEPHONE ENCOUNTER
Pt returned call. She states she can not afford to have the Echo and blood work done at this time. She does not have a job. She is considering keeping the 08/07/24.     She still has patient assistance paperwork with her and she will try and complete

## 2024-08-15 NOTE — PROGRESS NOTES
Called and spoke with patient.  Patient states that she has not taken the diltiazem as it made her feel bad.  She did not tolerate Toprol as well.  She does not like to take medication.  She states she does not know what her heart rate is that she cannot feel it.  She states she feels fine.  She did fill out paperwork for the Eliquis.  I did reinforce to patient that she needs to make follow-up appointments.  She states that she could not make the last 1 as her taxes went up and she is unable to afford to come in and see the doctors and keep her roof of her head.  Patient is aware of our concerns with her health and not managing her atrial fibrillation.  I did advise her she need to follow-up with Dr. Benito at the next appointment.

## 2024-08-19 RX ORDER — FUROSEMIDE 40 MG/1
TABLET ORAL
Qty: 60 TABLET | Refills: 3 | Status: SHIPPED | OUTPATIENT
Start: 2024-08-19

## 2024-08-23 ENCOUNTER — TELEPHONE (OUTPATIENT)
Dept: CARDIOLOGY CLINIC | Age: 74
End: 2024-08-23

## 2024-09-20 ENCOUNTER — TELEPHONE (OUTPATIENT)
Dept: CARDIOLOGY CLINIC | Age: 74
End: 2024-09-20

## 2024-10-18 ENCOUNTER — TELEPHONE (OUTPATIENT)
Dept: CARDIOLOGY CLINIC | Age: 74
End: 2024-10-18

## 2024-10-18 NOTE — TELEPHONE ENCOUNTER
Pt called requesting samples     apixaban (ELIQUIS) 5 MG TABS tablet [[id number]]     Last sample request: 09.20.2024

## 2024-11-29 ENCOUNTER — TELEPHONE (OUTPATIENT)
Dept: CARDIOLOGY CLINIC | Age: 74
End: 2024-11-29

## 2024-11-29 NOTE — TELEPHONE ENCOUNTER
Patient called and inquired about receiving samples of apixaban (ELIQUIS) 5 MG TABS tablet.  She wants a call back  [phone].

## 2024-12-10 RX ORDER — FUROSEMIDE 40 MG/1
TABLET ORAL
Qty: 135 TABLET | Refills: 0 | Status: SHIPPED | OUTPATIENT
Start: 2024-12-10

## 2024-12-10 NOTE — TELEPHONE ENCOUNTER
Requested Prescriptions     Pending Prescriptions Disp Refills    furosemide (LASIX) 40 MG tablet [Pharmacy Med Name: FUROSEMIDE 40MG TABLETS] 135 tablet 0     Sig: TAKE ONE TABLET BY MOUTH EVERY DAY AND ONE-HALF TABLET AS NEEDED FOR WEIGHT GAIN GREATER THAN 3 POUNDS AS DIRECTED            Checked Correct Pharmacy: Yes    Any changes since last refill? No     Number: 135    Refills: 0    Last Office Visit: 6/6/2024     Next Office Visit: Visit date not found / TBD , LVM top schedule f/u appt        Last Labs: 06.06.2024

## 2025-05-27 RX ORDER — FUROSEMIDE 40 MG/1
TABLET ORAL
Qty: 90 TABLET | Refills: 3 | Status: SHIPPED | OUTPATIENT
Start: 2025-05-27

## 2025-05-27 NOTE — TELEPHONE ENCOUNTER
Requested Prescriptions     Pending Prescriptions Disp Refills    furosemide (LASIX) 40 MG tablet [Pharmacy Med Name: FUROSEMIDE 40MG TABLETS] 90 tablet 3     Sig: TAKE ONE TABLET BY MOUTH EVERY DAY AND ONE-HALF TABLET AS NEEDED FOR WEIGHT GAIN GREATER THAN 3 POUNDS AS DIRECTED            Checked Correct Pharmacy: Yes    Any changes since last refill? No     Number: 90  Refills: 3    Last OV: 6/6/2024 Provider: JAXSON    Next OV: 5/29/2025 Provider: NPET    Last Labs:   CBC:  Lab Results   Component Value Date    WBC 7.3 06/06/2024    HGB 14.4 06/06/2024    HCT 42.8 06/06/2024    MCV 89.3 06/06/2024     06/06/2024    LYMPHOPCT 10.5 02/14/2018    RBC 4.79 06/06/2024    MCH 30.0 06/06/2024    MCHC 33.6 06/06/2024    RDW 15.6 (H) 06/06/2024           BMP:  Lab Results   Component Value Date/Time     06/06/2024 11:33 AM    K 3.1 06/06/2024 11:33 AM    K 4.7 06/05/2018 06:59 AM    CL 90 06/06/2024 11:33 AM    CO2 26 06/06/2024 11:33 AM    BUN 28 06/06/2024 11:33 AM    CREATININE 1.5 06/06/2024 11:33 AM    GLUCOSE 82 06/06/2024 11:33 AM    CALCIUM 10.2 06/06/2024 11:33 AM    LABGLOM 36 06/06/2024 11:33 AM    LABGLOM 50 03/23/2018 10:53 AM      Liver:  Lab Results   Component Value Date    ALT 13 06/06/2024    AST 26 06/06/2024    ALKPHOS 51 06/06/2024    BILITOT 0.7 06/06/2024     Magnesium:  Lab Results   Component Value Date/Time    MG 2.20 06/06/2024 11:33 AM     TSH:  Lab Results   Component Value Date    TSH 1.83 06/06/2024     BNP:  Lab Results   Component Value Date     (H) 02/28/2018

## 2025-05-28 NOTE — PROGRESS NOTES
[facility]   Electrophysiology  Office Visit  Date: 5/29/2025    Chief Complaint   Patient presents with    Medication Refill    Atrial Fibrillation       HPI/Cardiac HX: Charlotte Mccann is a 74 y.o. woman w/ a h/o HTN, anxiety, pAF (2/2018), CMP, echo (2/2018) EF 25-30%, refused ischemic eval, echo (6/2018) EF 50-55%, placed on GDMT, did not tolerate hydralazine, Toprol and lisinopril d/t \"fogginess\", lost to f/u. Multiple appt cancellations. Recurrent AF with RVR in OV (6/6/24). Not interested in taking medication d/t SE. + CP.  Stress test and Echo was ordered - not completed. 1 week CAM (6/2024) showed 100% AF, 1 episode of VT, 18 beats.   [id number] 4. TSH 3.03 (2/14/18).       Interval History:  Patient is here for follow-up for paroxysmal AF and dilated cardiomyopathy.  Last visit (6/2024) a stress test and echocardiogram was ordered due to patient feeling chest discomfort, shortness of breath.  However, the patient did not get this done at the time.  At the time she was found to be in A-fib with RVR in the heart rates of 140s.  Low-dose diltiazem was ordered but patient stopped this due to side effects.  Refuses BP, hydralazine, Toprol XL and lisinopril d/t s/e.     Today she presents in AF with RVR, rates 150s.  Patient states she is here for Eliquis samples as she has run out over the last month.  I had long conversation with patient regarding high heart rates.  Patient is hesitant to try any medications due to potential side effects.  She correlates her high heart rate to her high anxiety.  I had long conversation with patient the potential risks of AF with RVR.  She does complain of lack of energy and fatigue and occasional shortness of breath.  She has difficulty affording her medication as well.  Denies palpitations heart racing or irregular heartbeat.  Her blood pressure is well-controlled in the office today.  Her last echo was in 2018.  She does not recall having an echo ordered last

## 2025-05-29 ENCOUNTER — OFFICE VISIT (OUTPATIENT)
Dept: CARDIOLOGY CLINIC | Age: 75
End: 2025-05-29
Payer: MEDICARE

## 2025-05-29 VITALS
SYSTOLIC BLOOD PRESSURE: 136 MMHG | BODY MASS INDEX: 29.41 KG/M2 | HEART RATE: 153 BPM | WEIGHT: 166 LBS | DIASTOLIC BLOOD PRESSURE: 76 MMHG

## 2025-05-29 DIAGNOSIS — I48.19 PERSISTENT ATRIAL FIBRILLATION (HCC): Primary | ICD-10-CM

## 2025-05-29 DIAGNOSIS — I48.91 ATRIAL FIBRILLATION WITH RVR (HCC): ICD-10-CM

## 2025-05-29 DIAGNOSIS — I42.0 DILATED CARDIOMYOPATHY (HCC): ICD-10-CM

## 2025-05-29 DIAGNOSIS — I20.9 ANGINA PECTORIS: ICD-10-CM

## 2025-05-29 DIAGNOSIS — I48.21 PERMANENT ATRIAL FIBRILLATION (HCC): ICD-10-CM

## 2025-05-29 DIAGNOSIS — E78.2 MIXED HYPERLIPIDEMIA: ICD-10-CM

## 2025-05-29 PROCEDURE — 1159F MED LIST DOCD IN RCRD: CPT | Performed by: NURSE PRACTITIONER

## 2025-05-29 PROCEDURE — 93000 ELECTROCARDIOGRAM COMPLETE: CPT | Performed by: NURSE PRACTITIONER

## 2025-05-29 PROCEDURE — 1160F RVW MEDS BY RX/DR IN RCRD: CPT | Performed by: NURSE PRACTITIONER

## 2025-05-29 PROCEDURE — 3078F DIAST BP <80 MM HG: CPT | Performed by: NURSE PRACTITIONER

## 2025-05-29 PROCEDURE — 99215 OFFICE O/P EST HI 40 MIN: CPT | Performed by: NURSE PRACTITIONER

## 2025-05-29 PROCEDURE — 3075F SYST BP GE 130 - 139MM HG: CPT | Performed by: NURSE PRACTITIONER

## 2025-05-29 PROCEDURE — 1123F ACP DISCUSS/DSCN MKR DOCD: CPT | Performed by: NURSE PRACTITIONER

## 2025-06-12 ENCOUNTER — TELEPHONE (OUTPATIENT)
Dept: CARDIOLOGY CLINIC | Age: 75
End: 2025-06-12

## 2025-06-12 NOTE — TELEPHONE ENCOUNTER
A Healthy Lifestyle: Care Instructions Your Care Instructions A healthy lifestyle can help you feel good, stay at a healthy weight, and have plenty of energy for both work and play. A healthy lifestyle is something you can share with your whole family. A healthy lifestyle also can lower your risk for serious health problems, such as high blood pressure, heart disease, and diabetes. You can follow a few steps listed below to improve your health and the health of your family. Follow-up care is a key part of your treatment and safety. Be sure to make and go to all appointments, and call your doctor if you are having problems. It's also a good idea to know your test results and keep a list of the medicines you take. How can you care for yourself at home? · Do not eat too much sugar, fat, or fast foods. You can still have dessert and treats now and then. The goal is moderation. · Start small to improve your eating habits. Pay attention to portion sizes, drink less juice and soda pop, and eat more fruits and vegetables. ? Eat a healthy amount of food. A 3-ounce serving of meat, for example, is about the size of a deck of cards. Fill the rest of your plate with vegetables and whole grains. ? Limit the amount of soda and sports drinks you have every day. Drink more water when you are thirsty. ? Eat at least 5 servings of fruits and vegetables every day. It may seem like a lot, but it is not hard to reach this goal. A serving or helping is 1 piece of fruit, 1 cup of vegetables, or 2 cups of leafy, raw vegetables. Have an apple or some carrot sticks as an afternoon snack instead of a candy bar. Try to have fruits and/or vegetables at every meal. 
· Make exercise part of your daily routine. You may want to start with simple activities, such as walking, bicycling, or slow swimming. Try to be active 30 to 60 minutes every day.  You do not need to do all 30 to 60 The patient called requesting samples of Eliquis 5 mg. [phone]   minutes all at once. For example, you can exercise 3 times a day for 10 or 20 minutes. Moderate exercise is safe for most people, but it is always a good idea to talk to your doctor before starting an exercise program. 
· Keep moving. Rebecca Germane the lawn, work in the garden, or [employer]. Take the stairs instead of the elevator at work. · If you smoke, quit. People who smoke have an increased risk for heart attack, stroke, cancer, and other lung illnesses. Quitting is hard, but there are ways to boost your chance of quitting tobacco for good. ? Use nicotine gum, patches, or lozenges. ? Ask your doctor about stop-smoking programs and medicines. ? Keep trying. In addition to reducing your risk of diseases in the future, you will notice some benefits soon after you stop using tobacco. If you have shortness of breath or asthma symptoms, they will likely get better within a few weeks after you quit. · Limit how much alcohol you drink. Moderate amounts of alcohol (up to 2 drinks a day for men, 1 drink a day for women) are okay. But drinking too much can lead to liver problems, high blood pressure, and other health problems. Family health If you have a family, there are many things you can do together to improve your health. · Eat meals together as a family as often as possible. · Eat healthy foods. This includes fruits, vegetables, lean meats and dairy, and whole grains. · Include your family in your fitness plan. Most people think of activities such as jogging or tennis as the way to fitness, but there are many ways you and your family can be more active. Anything that makes you breathe hard and gets your heart pumping is exercise. Here are some tips: 
? Walk to do errands or to take your child to school or the bus. 
? Go for a family bike ride after dinner instead of watching TV. Where can you learn more? Go to http://www.[employer].[facility]/ Enter S645 in the search box to learn more about \"A Healthy Lifestyle: Care Instructions. \" Current as of: January 31, 2020               Content Version: 12.6 © 7853-2889 [employer]. Care instructions adapted under license by [employer] (which disclaims liability or warranty for this information). If you have questions about a medical condition or this instruction, always ask your healthcare professional. [place] [address] any warranty or liability for your use of this information.

## 2025-06-27 ENCOUNTER — TELEPHONE (OUTPATIENT)
Dept: CARDIOLOGY CLINIC | Age: 75
End: 2025-06-27

## 2025-06-27 NOTE — TELEPHONE ENCOUNTER
Sample request for Eliquis.    [phone]    Medication  apixaban (ELIQUIS) 5 MG TABS tablet [[id number]]  apixaban (ELIQUIS) 5 MG TABS tablet [[id number]]    Order Details  Dose: 5 mg Route: Oral Frequency: 2 TIMES DAILY   Dispense Quantity: 28 tablet Refills: 0    Note to Pharmacy: [id number]  exp 6/2026   # 3 boxes         Sig: Take 1 tablet by mouth 2 times daily

## 2025-07-11 ENCOUNTER — TELEPHONE (OUTPATIENT)
Dept: CARDIOLOGY CLINIC | Age: 75
End: 2025-07-11

## 2025-07-11 DIAGNOSIS — I48.19 PERSISTENT ATRIAL FIBRILLATION (HCC): Primary | ICD-10-CM

## 2025-07-11 NOTE — TELEPHONE ENCOUNTER
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 Benito additional options for rate control medication  Take Eliquis 5 mg BID -received samples today  Discussed a cardioversion, the patient states that she has had this in the past that does not work.  AF difficult to manage due to patient sensitivity and hesitation to medications. She is also very reluctant for any invasive procedures. She is willing to discuss an ablation.  Follow-up with Dr. Benito in 4 weeks.    Samples Eliquis 5 mg twice daily at . Left detailed message with patient - advised ready for .

## 2025-07-11 NOTE — TELEPHONE ENCOUNTER
Pt calling for samples of Eliquis    [phone]    Medication  apixaban (ELIQUIS) 5 MG TABS tablet [[id number]]  apixaban (ELIQUIS) 5 MG TABS tablet [[id number]]    Order Details  Dose: 5 mg Route: Oral Frequency: 2 TIMES DAILY

## 2025-09-05 ENCOUNTER — TELEPHONE (OUTPATIENT)
Dept: CARDIOLOGY CLINIC | Age: 75
End: 2025-09-05

## 2025-09-05 DIAGNOSIS — I48.19 PERSISTENT ATRIAL FIBRILLATION (HCC): ICD-10-CM
